# Patient Record
Sex: FEMALE | Race: OTHER | HISPANIC OR LATINO
[De-identification: names, ages, dates, MRNs, and addresses within clinical notes are randomized per-mention and may not be internally consistent; named-entity substitution may affect disease eponyms.]

---

## 2018-03-29 ENCOUNTER — APPOINTMENT (OUTPATIENT)
Dept: NEUROLOGY | Facility: CLINIC | Age: 43
End: 2018-03-29
Payer: MEDICAID

## 2018-03-29 VITALS
WEIGHT: 151 LBS | BODY MASS INDEX: 30.44 KG/M2 | DIASTOLIC BLOOD PRESSURE: 86 MMHG | HEIGHT: 59 IN | SYSTOLIC BLOOD PRESSURE: 118 MMHG

## 2018-03-29 DIAGNOSIS — G44.89 OTHER HEADACHE SYNDROME: ICD-10-CM

## 2018-03-29 DIAGNOSIS — R42 DIZZINESS AND GIDDINESS: ICD-10-CM

## 2018-03-29 PROCEDURE — 99214 OFFICE O/P EST MOD 30 MIN: CPT

## 2018-03-29 RX ORDER — NAPROXEN 500 MG/1
500 TABLET ORAL
Qty: 60 | Refills: 0 | Status: COMPLETED | COMMUNITY
Start: 2018-01-24

## 2018-03-29 RX ORDER — ACETAMINOPHEN 325 MG/1
TABLET, FILM COATED ORAL
Refills: 0 | Status: ACTIVE | COMMUNITY

## 2018-05-17 ENCOUNTER — APPOINTMENT (OUTPATIENT)
Dept: NEUROLOGY | Facility: CLINIC | Age: 43
End: 2018-05-17

## 2019-01-22 ENCOUNTER — EMERGENCY (EMERGENCY)
Facility: HOSPITAL | Age: 44
LOS: 1 days | Discharge: HOME | End: 2019-01-22
Attending: STUDENT IN AN ORGANIZED HEALTH CARE EDUCATION/TRAINING PROGRAM
Payer: COMMERCIAL

## 2019-01-22 VITALS
DIASTOLIC BLOOD PRESSURE: 100 MMHG | SYSTOLIC BLOOD PRESSURE: 157 MMHG | OXYGEN SATURATION: 97 % | HEART RATE: 118 BPM | WEIGHT: 149.91 LBS | HEIGHT: 60 IN | RESPIRATION RATE: 18 BRPM | TEMPERATURE: 100 F

## 2019-01-22 DIAGNOSIS — Z98.89 OTHER SPECIFIED POSTPROCEDURAL STATES: Chronic | ICD-10-CM

## 2019-01-22 DIAGNOSIS — Z90.710 ACQUIRED ABSENCE OF BOTH CERVIX AND UTERUS: Chronic | ICD-10-CM

## 2019-01-22 PROCEDURE — 99283 EMERGENCY DEPT VISIT LOW MDM: CPT | Mod: 25

## 2019-01-23 VITALS
OXYGEN SATURATION: 98 % | HEART RATE: 98 BPM | TEMPERATURE: 100 F | DIASTOLIC BLOOD PRESSURE: 86 MMHG | SYSTOLIC BLOOD PRESSURE: 145 MMHG | RESPIRATION RATE: 18 BRPM

## 2019-01-23 LAB — RAPID RVP RESULT: SIGNIFICANT CHANGE UP

## 2019-01-23 PROCEDURE — 87798 DETECT AGENT NOS DNA AMP: CPT

## 2019-01-23 PROCEDURE — 71046 X-RAY EXAM CHEST 2 VIEWS: CPT

## 2019-01-23 PROCEDURE — 87486 CHLMYD PNEUM DNA AMP PROBE: CPT

## 2019-01-23 PROCEDURE — 71046 X-RAY EXAM CHEST 2 VIEWS: CPT | Mod: 26

## 2019-01-23 PROCEDURE — 99283 EMERGENCY DEPT VISIT LOW MDM: CPT

## 2019-01-23 PROCEDURE — 87581 M.PNEUMON DNA AMP PROBE: CPT

## 2019-01-23 PROCEDURE — 87633 RESP VIRUS 12-25 TARGETS: CPT

## 2019-01-23 RX ORDER — IBUPROFEN 200 MG
600 TABLET ORAL ONCE
Qty: 0 | Refills: 0 | Status: COMPLETED | OUTPATIENT
Start: 2019-01-23 | End: 2019-01-23

## 2019-01-23 RX ADMIN — Medication 600 MILLIGRAM(S): at 00:33

## 2019-01-23 NOTE — ED PROVIDER NOTE - OBJECTIVE STATEMENT
42 y/o F with PMHx of HLD and PSHx of appendectomy, cholecystectomy and hysterectomy presents to ED c/o generalized body aches, sore throat, weakness, cough, runny nose and congestion onset several days ago. Last night, around 1900, took her temperature and was febrile. Was seen by PMD at onset of symptoms for cough and was given Azithromycin, which she has been taking. She did not get the flu shot this year. Denies chest pain, difficulty breathing, abdominal pain, nausea, vomiting, diarrhea, urinary symptoms or decreased PO intake. No further acute complaints at this time.    PMD: Reta

## 2019-01-23 NOTE — ED PROVIDER NOTE - ENMT, MLM
Airway patent, Nasal mucosa clear. Mouth with normal mucosa. Throat has no vesicles, no oropharyngeal exudates and uvula is midline. TM's clear bilaterally

## 2019-01-23 NOTE — ED PROVIDER NOTE - MEDICAL DECISION MAKING DETAILS
Patient is concerned she has the flu, will check RVP and CXR. Motrin for myalgias and fever control. D/c with PCP followup.

## 2019-01-23 NOTE — ED PROVIDER NOTE - ATTENDING CONTRIBUTION TO CARE
44 yo with flu like symptoms. I personally saw the patient with the PA, and completed the key components of the history and physical exam. I then discussed the management plan with the PA.

## 2019-01-23 NOTE — ED PROVIDER NOTE - CHPI ED SYMPTOMS NEG
no vomiting/no nausea/diarrhea, abdominal pain, chest pain, difficulty breathing, urinary sx or decreased PO intake

## 2020-05-29 ENCOUNTER — EMERGENCY (EMERGENCY)
Facility: HOSPITAL | Age: 45
LOS: 1 days | Discharge: HOME | End: 2020-05-29
Attending: EMERGENCY MEDICINE
Payer: COMMERCIAL

## 2020-05-29 VITALS
HEIGHT: 59 IN | TEMPERATURE: 98 F | RESPIRATION RATE: 20 BRPM | DIASTOLIC BLOOD PRESSURE: 116 MMHG | OXYGEN SATURATION: 99 % | WEIGHT: 153 LBS | SYSTOLIC BLOOD PRESSURE: 155 MMHG | HEART RATE: 90 BPM

## 2020-05-29 DIAGNOSIS — Z90.710 ACQUIRED ABSENCE OF BOTH CERVIX AND UTERUS: Chronic | ICD-10-CM

## 2020-05-29 DIAGNOSIS — Z98.89 OTHER SPECIFIED POSTPROCEDURAL STATES: Chronic | ICD-10-CM

## 2020-05-29 LAB
ALBUMIN SERPL ELPH-MCNC: 4.4 G/DL — SIGNIFICANT CHANGE UP (ref 3.3–5.2)
ALP SERPL-CCNC: 66 U/L — SIGNIFICANT CHANGE UP (ref 40–120)
ALT FLD-CCNC: 28 U/L — SIGNIFICANT CHANGE UP
ANION GAP SERPL CALC-SCNC: 15 MMOL/L — SIGNIFICANT CHANGE UP (ref 5–17)
AST SERPL-CCNC: 27 U/L — SIGNIFICANT CHANGE UP
BASOPHILS # BLD AUTO: 0.07 K/UL — SIGNIFICANT CHANGE UP (ref 0–0.2)
BASOPHILS NFR BLD AUTO: 0.6 % — SIGNIFICANT CHANGE UP (ref 0–2)
BILIRUB SERPL-MCNC: 0.2 MG/DL — LOW (ref 0.4–2)
BUN SERPL-MCNC: 11 MG/DL — SIGNIFICANT CHANGE UP (ref 8–20)
CALCIUM SERPL-MCNC: 9 MG/DL — SIGNIFICANT CHANGE UP (ref 8.6–10.2)
CHLORIDE SERPL-SCNC: 101 MMOL/L — SIGNIFICANT CHANGE UP (ref 98–107)
CO2 SERPL-SCNC: 21 MMOL/L — LOW (ref 22–29)
CREAT SERPL-MCNC: 0.61 MG/DL — SIGNIFICANT CHANGE UP (ref 0.5–1.3)
D DIMER BLD IA.RAPID-MCNC: <150 NG/ML DDU — SIGNIFICANT CHANGE UP
EOSINOPHIL # BLD AUTO: 0.1 K/UL — SIGNIFICANT CHANGE UP (ref 0–0.5)
EOSINOPHIL NFR BLD AUTO: 0.9 % — SIGNIFICANT CHANGE UP (ref 0–6)
GLUCOSE SERPL-MCNC: 99 MG/DL — SIGNIFICANT CHANGE UP (ref 70–99)
HCT VFR BLD CALC: 38 % — SIGNIFICANT CHANGE UP (ref 34.5–45)
HGB BLD-MCNC: 12.9 G/DL — SIGNIFICANT CHANGE UP (ref 11.5–15.5)
IMM GRANULOCYTES NFR BLD AUTO: 0.4 % — SIGNIFICANT CHANGE UP (ref 0–1.5)
LYMPHOCYTES # BLD AUTO: 2.79 K/UL — SIGNIFICANT CHANGE UP (ref 1–3.3)
LYMPHOCYTES # BLD AUTO: 24.1 % — SIGNIFICANT CHANGE UP (ref 13–44)
MAGNESIUM SERPL-MCNC: 2.3 MG/DL — SIGNIFICANT CHANGE UP (ref 1.6–2.6)
MCHC RBC-ENTMCNC: 30.9 PG — SIGNIFICANT CHANGE UP (ref 27–34)
MCHC RBC-ENTMCNC: 33.9 GM/DL — SIGNIFICANT CHANGE UP (ref 32–36)
MCV RBC AUTO: 90.9 FL — SIGNIFICANT CHANGE UP (ref 80–100)
MONOCYTES # BLD AUTO: 0.76 K/UL — SIGNIFICANT CHANGE UP (ref 0–0.9)
MONOCYTES NFR BLD AUTO: 6.6 % — SIGNIFICANT CHANGE UP (ref 2–14)
NEUTROPHILS # BLD AUTO: 7.83 K/UL — HIGH (ref 1.8–7.4)
NEUTROPHILS NFR BLD AUTO: 67.4 % — SIGNIFICANT CHANGE UP (ref 43–77)
PLATELET # BLD AUTO: 374 K/UL — SIGNIFICANT CHANGE UP (ref 150–400)
POTASSIUM SERPL-MCNC: 4.6 MMOL/L — SIGNIFICANT CHANGE UP (ref 3.5–5.3)
POTASSIUM SERPL-SCNC: 4.6 MMOL/L — SIGNIFICANT CHANGE UP (ref 3.5–5.3)
PROT SERPL-MCNC: 7.6 G/DL — SIGNIFICANT CHANGE UP (ref 6.6–8.7)
RBC # BLD: 4.18 M/UL — SIGNIFICANT CHANGE UP (ref 3.8–5.2)
RBC # FLD: 12.4 % — SIGNIFICANT CHANGE UP (ref 10.3–14.5)
SODIUM SERPL-SCNC: 137 MMOL/L — SIGNIFICANT CHANGE UP (ref 135–145)
TROPONIN T SERPL-MCNC: <0.01 NG/ML — SIGNIFICANT CHANGE UP (ref 0–0.06)
TROPONIN T SERPL-MCNC: <0.01 NG/ML — SIGNIFICANT CHANGE UP (ref 0–0.06)
WBC # BLD: 11.6 K/UL — HIGH (ref 3.8–10.5)
WBC # FLD AUTO: 11.6 K/UL — HIGH (ref 3.8–10.5)

## 2020-05-29 PROCEDURE — 93010 ELECTROCARDIOGRAM REPORT: CPT

## 2020-05-29 PROCEDURE — 85379 FIBRIN DEGRADATION QUANT: CPT

## 2020-05-29 PROCEDURE — 93971 EXTREMITY STUDY: CPT

## 2020-05-29 PROCEDURE — 71045 X-RAY EXAM CHEST 1 VIEW: CPT | Mod: 26

## 2020-05-29 PROCEDURE — 93971 EXTREMITY STUDY: CPT | Mod: 26,LT

## 2020-05-29 PROCEDURE — 93005 ELECTROCARDIOGRAM TRACING: CPT

## 2020-05-29 PROCEDURE — 99285 EMERGENCY DEPT VISIT HI MDM: CPT

## 2020-05-29 PROCEDURE — 84484 ASSAY OF TROPONIN QUANT: CPT

## 2020-05-29 PROCEDURE — 36415 COLL VENOUS BLD VENIPUNCTURE: CPT

## 2020-05-29 PROCEDURE — 83735 ASSAY OF MAGNESIUM: CPT

## 2020-05-29 PROCEDURE — 80053 COMPREHEN METABOLIC PANEL: CPT

## 2020-05-29 PROCEDURE — 99284 EMERGENCY DEPT VISIT MOD MDM: CPT | Mod: 25

## 2020-05-29 PROCEDURE — 71045 X-RAY EXAM CHEST 1 VIEW: CPT

## 2020-05-29 PROCEDURE — 85027 COMPLETE CBC AUTOMATED: CPT

## 2020-05-29 RX ORDER — IBUPROFEN 200 MG
600 TABLET ORAL ONCE
Refills: 0 | Status: COMPLETED | OUTPATIENT
Start: 2020-05-29 | End: 2020-05-29

## 2020-05-29 RX ADMIN — Medication 600 MILLIGRAM(S): at 23:40

## 2020-05-29 NOTE — ED PROVIDER NOTE - NSFOLLOWUPCLINICS_GEN_ALL_ED_FT
[facility]  Cardiology  [address], [address]  [place] [address]  Phone: [phone]  Fax:   Follow Up Time: Urgent

## 2020-05-29 NOTE — ED PROVIDER NOTE - NSFOLLOWUPINSTRUCTIONS_ED_ALL_ED_FT
FOLLOW UP WITH PRIMARY CARE PROVIDER IN 1-2 DAYS     FOLLOW UP WITH CARDIOLOGY WITHIN 1 WEEK     Chest Pain    Chest pain can be caused by many different conditions which may or may not be dangerous. Causes include heartburn, lung infections, heart attack, blood clot in lungs, skin infections, strain or damage to muscle, cartilage, or bones, etc. In addition to a history and physical examination, an electrocardiogram (ECG) or other lab tests may have been performed to determine the cause of your chest pain. Follow up with your primary care provider or with a cardiologist as instructed.     SEEK IMMEDIATE MEDICAL CARE IF YOU HAVE ANY OF THE FOLLOWING SYMPTOMS: worsening chest pain, coughing up blood, unexplained back/neck/jaw pain, severe abdominal pain, dizziness or lightheadedness, fainting, shortness of breath, sweaty or clammy skin, vomiting, or racing heart beat. These symptoms may represent a serious problem that is an emergency. Do not wait to see if the symptoms will go away. Get medical help right away. Call 911 and do not drive yourself to the hospital.

## 2020-05-29 NOTE — ED PROVIDER NOTE - PATIENT PORTAL LINK FT
You can access the FollowMyHealth Patient Portal offered by [facility] by registering at the following website: [email or web address]. By joining [employer]’s FollowMyHealth portal, you will also be able to view your health information using other applications (apps) compatible with our system. You can access the FollowMyHealth Patient Portal offered by [facility] by registering at the following website: [email or web address]. By joining [employer]’s FollowMyHealth portal, you will also be able to view your health information using other applications (apps) compatible with our system.

## 2020-05-29 NOTE — ED STATDOCS - PROGRESS NOTE DETAILS
43 y/o F pt with significant PMHx of HTN and HLD presents to the ED c/o dizziness that started five hours ago. She further reports mild CP and nausea. Pt stated that her sx became worse after eating. She denies smoking but reports that her father  of a stroke at the age of 52. No further complaints at this time.

## 2020-05-29 NOTE — ED PROVIDER NOTE - ATTENDING CONTRIBUTION TO CARE
HPI: 45yo female pmh hyperlipidemia presenting with episode of dull headache a/w feeling lightheaded, followed by episode of chest pain. Patient states that she thought she would feel better after she ate something, however noticed that headache improved but then developed chest pain. No fevers/chills. No cough. No shortness of breath. No diaphoresis. No h/o Coronary Artery Disease or myocardial infarction. NO h/o DVT/PE. Patient does notice that L antecubital fossa has been swollen and more painful over last 4 days.     PE:  Gen: NAD  Head: NCAT  HEENT: Oral mucosa moist, normal conjunctiva  CV: RRR no murmurs, normal perfusion  Resp: CTA b/l, no wheezing, rales, rhonchi, no respiratory distress  GI: Abd Soft NTND  Neuro: No focal neuro deficits  MSK: FROM all 4 extremities, no deformity  Skin: L antecubital fossa slightly edematous, mildly tender  Psych: Normal affect    MDM: 45yo female with headache and chest pain, headache now resolved. Neuro intact. EKG normal. US LUE neg for deep venous thrombosis. D-dimer negative- unlikely PE. Pending rpt trop r/o acute coronary syndrome, if unremarkable dc home with cards f/u. Adriana Lanier DO     I performed a history and physical exam of the patient and discussed their management with the advanced care provider. I reviewed the advanced care provider's note and agree with the documented findings and plan of care. My medical decision making and objective findings are found above.

## 2020-05-29 NOTE — ED PROVIDER NOTE - OBJECTIVE STATEMENT
45 y/o female with beto of migraines and borderline DM/HTN/HLD presents with complaints of mid sternal chest pain upon inspiration associated with weakness and elevated BP. She stated that she developed ha, nausea, and dizziness around noon today which she attributed to not having eaten breakfast. After eating lunch the sx subsided and the ha improved. She then developed weakness and chest pain upon inspiration, she check her BP which was 152 systolic. She also complains of LUE arm pain x 5 days. She denies any radiating pain, fevers, changes in vision, vomiting, diarrhea, sick contacts, abdominal pain, , or GI pain.

## 2020-05-29 NOTE — ED PROVIDER NOTE - MUSCULOSKELETAL MINIMAL EXAM
normal range of motion/atraumatic/TENDERNESS/L arm TTP from Mid bicep to mid forearm on medial aspect, no edema, erythema, or ecchymosis atraumatic/normal range of motion/TENDERNESS/L arm TTP from Mid bicep to mid forearm on medial aspect with mild edema, no erythema or ecchymosis

## 2020-05-29 NOTE — ED PROVIDER NOTE - PROGRESS NOTE DETAILS
NP Arena: Patient verbalizes improvement of sx but now has anterior chest wall tenderness. Will give motrin 600 mg PO. Pending 2nd Troponin, if negative will d/c home with PCP and Cardiology follow up. Patient verbalizes understanding and agrees with plan of care.

## 2020-05-29 NOTE — ED PROVIDER NOTE - CLINICAL SUMMARY MEDICAL DECISION MAKING FREE TEXT BOX
45 y/o female with beto of migraines and borderline HTN/DM/HLD presenting with mid sternal cp upon inspiration since 1200 today and LUE pain x 5 days that is ttp. Will rule out acs vs PE and order d-dimer, troponin, cxray, ekg, cbc, cmp. 43 y/o female with beto of migraines and borderline HTN/DM/HLD presenting with mid sternal cp upon inspiration since 1200 today and LUE pain x 5 days that is ttp. Will rule out acs vs PE and order d-dimer, troponin, cxray, ekg, cbc, cmp, LUE US venous duplex.

## 2021-01-10 ENCOUNTER — EMERGENCY (EMERGENCY)
Facility: HOSPITAL | Age: 46
LOS: 1 days | Discharge: HOME | End: 2021-01-10
Attending: EMERGENCY MEDICINE
Payer: COMMERCIAL

## 2021-01-10 VITALS
TEMPERATURE: 99 F | HEART RATE: 102 BPM | SYSTOLIC BLOOD PRESSURE: 170 MMHG | HEIGHT: 59 IN | WEIGHT: 149.91 LBS | RESPIRATION RATE: 18 BRPM | OXYGEN SATURATION: 96 % | DIASTOLIC BLOOD PRESSURE: 103 MMHG

## 2021-01-10 DIAGNOSIS — Z90.710 ACQUIRED ABSENCE OF BOTH CERVIX AND UTERUS: Chronic | ICD-10-CM

## 2021-01-10 DIAGNOSIS — Z98.89 OTHER SPECIFIED POSTPROCEDURAL STATES: Chronic | ICD-10-CM

## 2021-01-10 PROCEDURE — 99283 EMERGENCY DEPT VISIT LOW MDM: CPT

## 2021-01-10 PROCEDURE — 73562 X-RAY EXAM OF KNEE 3: CPT

## 2021-01-10 PROCEDURE — 73562 X-RAY EXAM OF KNEE 3: CPT | Mod: 26,RT

## 2021-01-10 RX ORDER — ACETAMINOPHEN 500 MG
650 TABLET ORAL ONCE
Refills: 0 | Status: COMPLETED | OUTPATIENT
Start: 2021-01-10 | End: 2021-01-10

## 2021-01-10 RX ORDER — KETOROLAC TROMETHAMINE 30 MG/ML
30 SYRINGE (ML) INJECTION ONCE
Refills: 0 | Status: DISCONTINUED | OUTPATIENT
Start: 2021-01-10 | End: 2021-01-10

## 2021-01-10 RX ADMIN — Medication 650 MILLIGRAM(S): at 12:32

## 2021-01-10 NOTE — ED PROVIDER NOTE - PROGRESS NOTE DETAILS
xray results discussed with the patient, discussed the need to follow up with orthopedic for further assessment

## 2021-01-10 NOTE — ED PROVIDER NOTE - ATTENDING CONTRIBUTION TO CARE
Gillian: I performed a face to face bedside interview with patient regarding history of present illness, review of symptoms and past medical history. I completed an independent physical exam.  I have discussed patient's plan of care with advanced care provider.   I agree with note as stated above including HISTORY OF PRESENT ILLNESS, HIV, PAST MEDICAL/SURGICAL/FAMILY/SOCIAL HISTORY, ALLERGIES AND HOME MEDICATIONS, REVIEW OF SYSTEMS, PHYSICAL EXAM, MEDICAL DECISION MAKING and any PROGRESS NOTES during the time I functioned as the attending physician for this patient  unless otherwise noted. My brief assessment is as follows: Patient with acute on chronic knee pain s/p twisting it. Exam notable for minimal swelling, +ttp over medial knee. no patellar ttp. no laxity. DROM 2/2 pain. Likely ligamentous injury, plan for xray, pain control. Likely knee immobilizer/crutches and ortho f/u.

## 2021-01-10 NOTE — ED PROVIDER NOTE - PATIENT PORTAL LINK FT
You can access the FollowMyHealth Patient Portal offered by [facility] by registering at the following website: [email or web address]. By joining [employer]’s FollowMyHealth portal, you will also be able to view your health information using other applications (apps) compatible with our system.

## 2021-01-10 NOTE — ED PROVIDER NOTE - OBJECTIVE STATEMENT
45 year old female with no known pmhx presents with c/o knee pain. Pt states she injured her [place] she was 18 years old [place] she [place] hit by a baseball to the posterior knee and fell onto the anterior knee. Pt states for the last 2 years the pain has increased and she has had many episodes of the knee "giving out". Last night she was dancing when she felt increased pain and it is now difficult to walk. She has never had this injury evaluated before nor had any prior imaging. Has been taking tylenol for pain relief. Denies falls, numbness and tingling.

## 2021-01-10 NOTE — ED PROVIDER NOTE - PHYSICAL EXAMINATION
msk: right knee swelling, tenderness along lateral patella, increased pain with varus stress and extension. decreased range of motion with extension, normal flexion. 4+ pulses. skin normal, no lesions, erythema or warmth to skin

## 2021-01-10 NOTE — ED PROVIDER NOTE - NSFOLLOWUPINSTRUCTIONS_ED_ALL_ED_FT
Follow up with orthopedist   Take tylenol as needed for pain   Wear knee immobilizer and use crutches   Ice   rest   Elevate

## 2021-01-10 NOTE — ED PROVIDER NOTE - CARE PROVIDER_API CALL
Breonna Elizabeth)  Orthopedics  [address] [place] [address], [address]  [place] [address]  Phone: [phone]  Fax: [phone]  Follow Up Time:

## 2021-01-19 ENCOUNTER — APPOINTMENT (OUTPATIENT)
Dept: ORTHOPEDIC SURGERY | Facility: CLINIC | Age: 46
End: 2021-01-19
Payer: MEDICAID

## 2021-01-19 VITALS
BODY MASS INDEX: 30.24 KG/M2 | SYSTOLIC BLOOD PRESSURE: 163 MMHG | HEART RATE: 76 BPM | WEIGHT: 150 LBS | HEIGHT: 59 IN | DIASTOLIC BLOOD PRESSURE: 83 MMHG

## 2021-01-19 PROCEDURE — 99204 OFFICE O/P NEW MOD 45 MIN: CPT

## 2021-01-19 PROCEDURE — 99072 ADDL SUPL MATRL&STAF TM PHE: CPT

## 2021-01-27 ENCOUNTER — OUTPATIENT (OUTPATIENT)
Dept: OUTPATIENT SERVICES | Facility: HOSPITAL | Age: 46
LOS: 1 days | End: 2021-01-27
Payer: COMMERCIAL

## 2021-01-27 ENCOUNTER — APPOINTMENT (OUTPATIENT)
Dept: MRI IMAGING | Facility: CLINIC | Age: 46
End: 2021-01-27
Payer: MEDICAID

## 2021-01-27 DIAGNOSIS — M25.561 PAIN IN RIGHT KNEE: ICD-10-CM

## 2021-01-27 DIAGNOSIS — Z90.710 ACQUIRED ABSENCE OF BOTH CERVIX AND UTERUS: Chronic | ICD-10-CM

## 2021-01-27 DIAGNOSIS — Z98.89 OTHER SPECIFIED POSTPROCEDURAL STATES: Chronic | ICD-10-CM

## 2021-01-27 DIAGNOSIS — Z00.8 ENCOUNTER FOR OTHER GENERAL EXAMINATION: ICD-10-CM

## 2021-01-27 PROCEDURE — 73721 MRI JNT OF LWR EXTRE W/O DYE: CPT | Mod: 26,RT

## 2021-01-27 PROCEDURE — 73721 MRI JNT OF LWR EXTRE W/O DYE: CPT

## 2021-02-10 ENCOUNTER — APPOINTMENT (OUTPATIENT)
Dept: ORTHOPEDIC SURGERY | Facility: CLINIC | Age: 46
End: 2021-02-10

## 2021-03-03 ENCOUNTER — APPOINTMENT (OUTPATIENT)
Dept: ORTHOPEDIC SURGERY | Facility: CLINIC | Age: 46
End: 2021-03-03
Payer: MEDICAID

## 2021-03-03 VITALS
DIASTOLIC BLOOD PRESSURE: 82 MMHG | WEIGHT: 150 LBS | BODY MASS INDEX: 30.24 KG/M2 | HEIGHT: 59 IN | SYSTOLIC BLOOD PRESSURE: 157 MMHG

## 2021-03-03 DIAGNOSIS — Z72.3 LACK OF PHYSICAL EXERCISE: ICD-10-CM

## 2021-03-03 DIAGNOSIS — Z78.9 OTHER SPECIFIED HEALTH STATUS: ICD-10-CM

## 2021-03-03 PROCEDURE — 99072 ADDL SUPL MATRL&STAF TM PHE: CPT

## 2021-03-03 PROCEDURE — 99214 OFFICE O/P EST MOD 30 MIN: CPT

## 2021-03-03 NOTE — REVIEW OF SYSTEMS
[Joint Pain] : joint pain [Joint Stiffness] : joint stiffness [Negative] : Heme/Lymph [FreeTextEntry9] : right knee pain and swelling

## 2021-03-03 NOTE — PHYSICAL EXAM
[de-identified] : General:\par Awake, alert, no acute distress, Patient was cooperative and appropriate during the examination.\par \par The patient is of normal weight for height and age.\par \par Walks with an antalgic gait on the right side.\par \par Full, painless range of motion of the neck and back.\par \par Exam of the bilateral lower extremities is intact and symmetric with regards to dermatologic, vascular, and neurologic exam. Bilateral lower extremity sensation is grossly intact to light touch in the DP/SP/T/S/S nerve distributions. Intact DF/PF/EHL. BIlateral lower extremity warm and well-perfused with brisk capillary refill.\par \par Pulmonary:\par Regular, nonlabored breathing\par \par Abdomen:\par Soft, nontender, nondistended.\par \par Lymphatic:\par No evidence of inguinal lymphadenopathy\par \par Right knee Examination:\par Physical examination of the knee demonstrates normal skin without signs of skin changes or abnormalities. No erythema or warmth is appreciated. There is a moderate joint effusion.\par \par Sensation is intact to light touch L2-S1\par Palpable DP/PT pulse\par EHL/FHL/TA/GSC motor function intact\par \par Range of Motion\par 0 to 130 degrees with pain at terminal flexion\par \par Strength Testing\par Quadriceps/Hamstring 5/5\par Patient is able to perform a straight leg raise without difficulty.\par \par Palpation\par Not tender to palpation about the distal femur, proximal tibia, or patella\par No palpable defect appreciated in the quadriceps or patellar tendons\par Exquisitely tender to palpation of medial joint line\par Mildly tender to palpation of lateral joint line\par \par Special Tests\par Anterior Drawer positive\par Posterior Drawer negative\par Lachman Exam grade 2B\par No Varus or Valgus Laxity at 0 or 30 degrees of knee flexion\par Chapincito's Test positive medially\par Active compression of the patella is negative for pain\par Translation of the patella less than 2 quadrants with a firm endpoint [de-identified] : The following radiographs were obtained at the ED on 1/10/21.  I reviewed each radiograph in detail with the patient and discussed the findings as highlighted below.\par \par 3 views of the right knee were unremarkable.\par \par MRI obtained from a [facility] imaging facility was reviewed in great detail with the patient and reveals:\par 1. Anterior cruciate ligament rupture is of indeterminate age is no osseous contusions are appreciated.\par 2. Bucket-handle tear of the medial meniscus with large flap displaced into the intercondylar notch.\par 3. Mild medial gastrocnemius muscle strain.\par 4. Medial gastrocnemius tear is present that may be incidental. Ultrasound may be helpful for further evaluation of thrombophlebitis as warranted.\par \par \par \par \par \par \par

## 2021-03-03 NOTE — DISCUSSION/SUMMARY
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  I reviewed the patient's MRI today with her in the office which demonstrates a chronic ACL rupture and acute bucket-handle tear of the medial meniscus with the meniscus displaced into the intercondylar notch.  We discussed the risks and benefits of no treatment as well as nonoperative and operative treatments.  Nonsurgical treatment would include anti-inflammatories as needed, bracing as needed, and progressive physical therapy to help improve her pain, swelling and restore her motion.  The advantages of nonsurgical treatment are that it avoids the risks associated with surgery.  The disadvantages of nonsurgical treatment are that it is unlikely to heal her meniscus and her meniscus can continue to cause her significant pain and swelling.  Surgical intervention could include 2 options.  The first option is to perform a right knee arthroscopy with partial medial meniscectomy versus possible repair, chondroplasty, and synovectomy.  The second option is to perform a right knee arthroscopically assisted ACL reconstruction with BTB allograft, partial medial meniscectomy versus possible repair, chondroplasty, and synovectomy.  The advantages of the knee arthroscopy in isolation are that it would address her pain and swelling associated with her meniscus tear and the recovery is much faster.  However, it would fail to address her pre-existing instability which she has been living with since she was 18 years of age.  The advantages of the ACL reconstruction are that it would address both the meniscus as well as the ACL which would correct her instability and protect her knee from progressive arthritis in the long-term.  The disadvantages are that there are higher complications a much longer recovery.  I expect patients to return to full activities without restrictions at about 6 to 9 months after an ACL reconstruction procedure.  The patient understands all these options and does not wish to undergo an ACL reconstruction procedure because of the prolonged rehabilitation.  She would prefer to proceed with the arthroscopic procedure to address her meniscus tear only.  The risks and benefits of the surgery were discussed in detail.  The benefits include improved knee pain and function.  This will not address her pre-existing instability and this could lead to progressive arthritis and further damage to her meniscus in the future because of her instability.  The patient understands.  The risks of surgery include but not limited to infection, blood loss, blood clots, neurovascular injury, stiffness/loss of range of motion, persistent instability, progressive arthritis, anesthesia related complications including paralysis and death, and the need for further surgery in the future.  In the setting of a meniscus repair she will be restricted to partial weightbearing for approximately 6 weeks in a brace.  If a partial meniscectomy is performed she will be allowed to bear weight as tolerated with use of crutches for assistance with ambulation.  I expect most patients to recover 2 to 3 months after a partial meniscectomy and 4 to 6 months after a meniscal repair.  Patient verbalizes understanding of all surgical risks as well as the anticipated postoperative course and would like to proceed with a knee arthroscopy.  She will speak with my surgical coordinator regarding a date and time for the procedure.  All questions were answered to her satisfaction.\par

## 2021-03-03 NOTE — HISTORY OF PRESENT ILLNESS
[Stable] : stable [___ wks] : [unfilled] week(s) ago [5] : an average pain level of 5/10 [4] : a minimum pain level of 4/10 [9] : a maximum pain level of 9/10 [de-identified] : LIAM is a 45 year old female who presents today for initial evaluation of right knee pain following twisting injury on 1/10/21.  She has hx. of right knee injury at age 18 in her [place]. She did not seek treatment at that time.  She admits to experiencing frequent knee instability for many years leading up to her most recent injury. In January of this year she twisted her knee dancing with friends. She twisted, felt a tear, and had progressively worsening swelling. She went to the ED on 1/10/21 where x-rays of the right knee were obtained which were unremarkable for osseous pathology.  She followed up with Dr. Elizabeth 9 days later who at that time ordered an MRI and Meloxicam.  \par \par Currently, patient presents wearing a neoprene knee sleeve that she states is too small for her.  She admits to inability to fully WB in the mornings.  She denies mechanical symptoms such as locking, catching, or popping.  She does, however, admits to feeling extremely unstable and weak.  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  The patient denies numbness/tingling to the extremity.  She also denies recent fever, chills, redness, nausea, vomiting, or illness.\par

## 2021-03-03 NOTE — REASON FOR VISIT
[Initial Visit] : an initial visit for [Knee Injury] : knee injury [FreeTextEntry2] : right knee pain and swelling.

## 2021-03-29 ENCOUNTER — APPOINTMENT (OUTPATIENT)
Dept: ULTRASOUND IMAGING | Facility: CLINIC | Age: 46
End: 2021-03-29
Payer: MEDICAID

## 2021-03-29 ENCOUNTER — EMERGENCY (EMERGENCY)
Facility: HOSPITAL | Age: 46
LOS: 1 days | Discharge: HOME | End: 2021-03-29
Attending: EMERGENCY MEDICINE
Payer: COMMERCIAL

## 2021-03-29 ENCOUNTER — OUTPATIENT (OUTPATIENT)
Dept: OUTPATIENT SERVICES | Facility: HOSPITAL | Age: 46
LOS: 1 days | End: 2021-03-29

## 2021-03-29 ENCOUNTER — APPOINTMENT (OUTPATIENT)
Dept: ORTHOPEDIC SURGERY | Facility: CLINIC | Age: 46
End: 2021-03-29
Payer: MEDICAID

## 2021-03-29 VITALS
OXYGEN SATURATION: 97 % | TEMPERATURE: 98 F | RESPIRATION RATE: 18 BRPM | HEIGHT: 59 IN | SYSTOLIC BLOOD PRESSURE: 193 MMHG | HEART RATE: 92 BPM | WEIGHT: 149.91 LBS | DIASTOLIC BLOOD PRESSURE: 105 MMHG

## 2021-03-29 VITALS
BODY MASS INDEX: 30.24 KG/M2 | HEART RATE: 77 BPM | WEIGHT: 150 LBS | HEIGHT: 59 IN | DIASTOLIC BLOOD PRESSURE: 99 MMHG | SYSTOLIC BLOOD PRESSURE: 164 MMHG

## 2021-03-29 VITALS — DIASTOLIC BLOOD PRESSURE: 86 MMHG | SYSTOLIC BLOOD PRESSURE: 166 MMHG

## 2021-03-29 VITALS — TEMPERATURE: 98.6 F

## 2021-03-29 DIAGNOSIS — Z98.89 OTHER SPECIFIED POSTPROCEDURAL STATES: Chronic | ICD-10-CM

## 2021-03-29 DIAGNOSIS — Z90.710 ACQUIRED ABSENCE OF BOTH CERVIX AND UTERUS: Chronic | ICD-10-CM

## 2021-03-29 DIAGNOSIS — M79.604 PAIN IN RIGHT LEG: ICD-10-CM

## 2021-03-29 LAB
ANION GAP SERPL CALC-SCNC: 14 MMOL/L — SIGNIFICANT CHANGE UP (ref 5–17)
BUN SERPL-MCNC: 11 MG/DL — SIGNIFICANT CHANGE UP (ref 8–20)
CALCIUM SERPL-MCNC: 8.8 MG/DL — SIGNIFICANT CHANGE UP (ref 8.6–10.2)
CHLORIDE SERPL-SCNC: 100 MMOL/L — SIGNIFICANT CHANGE UP (ref 98–107)
CO2 SERPL-SCNC: 25 MMOL/L — SIGNIFICANT CHANGE UP (ref 22–29)
CREAT SERPL-MCNC: 0.85 MG/DL — SIGNIFICANT CHANGE UP (ref 0.5–1.3)
GLUCOSE SERPL-MCNC: 124 MG/DL — HIGH (ref 70–99)
HCT VFR BLD CALC: 37.7 % — SIGNIFICANT CHANGE UP (ref 34.5–45)
HGB BLD-MCNC: 12.8 G/DL — SIGNIFICANT CHANGE UP (ref 11.5–15.5)
MCHC RBC-ENTMCNC: 31.1 PG — SIGNIFICANT CHANGE UP (ref 27–34)
MCHC RBC-ENTMCNC: 34 GM/DL — SIGNIFICANT CHANGE UP (ref 32–36)
MCV RBC AUTO: 91.7 FL — SIGNIFICANT CHANGE UP (ref 80–100)
PLATELET # BLD AUTO: 382 K/UL — SIGNIFICANT CHANGE UP (ref 150–400)
POTASSIUM SERPL-MCNC: 3.7 MMOL/L — SIGNIFICANT CHANGE UP (ref 3.5–5.3)
POTASSIUM SERPL-SCNC: 3.7 MMOL/L — SIGNIFICANT CHANGE UP (ref 3.5–5.3)
RBC # BLD: 4.11 M/UL — SIGNIFICANT CHANGE UP (ref 3.8–5.2)
RBC # FLD: 12.6 % — SIGNIFICANT CHANGE UP (ref 10.3–14.5)
SODIUM SERPL-SCNC: 139 MMOL/L — SIGNIFICANT CHANGE UP (ref 135–145)
WBC # BLD: 11.34 K/UL — HIGH (ref 3.8–10.5)
WBC # FLD AUTO: 11.34 K/UL — HIGH (ref 3.8–10.5)

## 2021-03-29 PROCEDURE — 86769 SARS-COV-2 COVID-19 ANTIBODY: CPT

## 2021-03-29 PROCEDURE — 93971 EXTREMITY STUDY: CPT | Mod: 26,RT

## 2021-03-29 PROCEDURE — 85027 COMPLETE CBC AUTOMATED: CPT

## 2021-03-29 PROCEDURE — 99072 ADDL SUPL MATRL&STAF TM PHE: CPT

## 2021-03-29 PROCEDURE — 99283 EMERGENCY DEPT VISIT LOW MDM: CPT

## 2021-03-29 PROCEDURE — 99213 OFFICE O/P EST LOW 20 MIN: CPT

## 2021-03-29 PROCEDURE — 36415 COLL VENOUS BLD VENIPUNCTURE: CPT

## 2021-03-29 PROCEDURE — 99284 EMERGENCY DEPT VISIT MOD MDM: CPT

## 2021-03-29 PROCEDURE — 80048 BASIC METABOLIC PNL TOTAL CA: CPT

## 2021-03-29 RX ORDER — RIVAROXABAN 15 MG-20MG
15 KIT ORAL ONCE
Refills: 0 | Status: COMPLETED | OUTPATIENT
Start: 2021-03-29 | End: 2021-03-29

## 2021-03-29 RX ORDER — RIVAROXABAN 15 MG-20MG
1 KIT ORAL
Qty: 60 | Refills: 0
Start: 2021-03-29 | End: 2021-04-27

## 2021-03-29 RX ADMIN — RIVAROXABAN 15 MILLIGRAM(S): KIT at 23:32

## 2021-03-29 NOTE — ED ADULT TRIAGE NOTE - CHIEF COMPLAINT QUOTE
patient states that she twisted leg in january, pain to right knee, seen by ortho today, complaining of pain to back of leg pain and swelling sent by MD

## 2021-03-29 NOTE — ED STATDOCS - PHYSICAL EXAMINATION
Gen: NAD, AOx3  Head: NCAT  HEENT: PERRL, EOMI, oral mucosa moist, normal conjunctiva, neck supple  Lung: CTAB, no respiratory distress  CV: rrr, no murmur, Normal perfusion  Abd: soft, NTND, no CVA tenderness  MSK: No edema, no visible deformities  Neuro: No focal neurologic deficits  Skin: No rash   Psych: normal affect Gen: NAD, AOx3  Head: NCAT  HEENT: PERRL, EOMI, oral mucosa moist, normal conjunctiva, neck supple  Lung: CTAB, no respiratory distress  CV: rrr, no murmur, Normal perfusion  MSK: No pitting edema, no visible deformities  Neuro: No focal neurologic deficits  Skin: No rash   Psych: normal affect

## 2021-03-29 NOTE — ED STATDOCS - PROGRESS NOTE DETAILS
MEL GONZÁLES: PT evaluated by intake physician. HPI/PE/ROS as noted above. Will follow up plan per intake physician  labs stable written for xarelto starter pack to be picked up at [facility] in the am and first dose this evening . given strict return precautions

## 2021-03-29 NOTE — ED STATDOCS - OBJECTIVE STATEMENT
46 y/o F with PMHx of appendectomy, cholecystectomy, hysterectomy, HLD presents to the ED c/o R calf pain. Pt explains that she saw her orthopedic today for a procedure she is going to have to her knee and told him that she has been having R calf pain. Pt had a sonogram that showed DVT.  Denies CP, difficulty breathing, blood in stool 44 y/o F with PMHx of appendectomy, cholecystectomy, hysterectomy, HLD presents to the ED c/o R calf pain. Pt explains that she saw her orthopedic today for a procedure she is going to have to her knee and told him that she has been having R calf pain. Pt had a sonogram that showed DVT. Pt states that she had rectal bleeding x2 months ago that was resolved.   Denies CP, difficulty breathing, blood in stool

## 2021-03-29 NOTE — ED STATDOCS - PATIENT PORTAL LINK FT
You can access the FollowMyHealth Patient Portal offered by [facility] by registering at the following website: [email or web address]. By joining [employer]’s FollowMyHealth portal, you will also be able to view your health information using other applications (apps) compatible with our system.

## 2021-03-29 NOTE — ED STATDOCS - NSFOLLOWUPINSTRUCTIONS_ED_ALL_ED_FT
por favor, recoja el medicamento en la farmacia [facility] en el [facility] por la mañana y tómelo según las indicaciones  siga con ortopedia y con steve médico de atención primaria    La trombosis venosa profunda  Elie trombosis venosa profunda (TVP) es un coágulo de sonja (trombo) que generalmente ocurre en elie vena profunda y más sharon de la parte inferior de la pierna o la pelvis, o en elie extremidad superior shawna el brazo. Estos son peligrosos y pueden provocar complicaciones graves e incluso potencialmente mortales si el coágulo viaja a los pulmones. Los síntomas incluyen hinchazón del brazo o la pierna, calor y enrojecimiento del brazo o la pierna y dolor. El tratamiento puede incluir lee ann un medicamento anticoagulante; asegúrese de lee ann todo lo prescrito según las instrucciones.    BUSQUE ATENCIÓN MÉDICA INMEDIATA SI TIENE ALGUNO DE LOS SIGUIENTES SÍNTOMAS: dificultad para respirar, dolor en el pecho, latidos cardíacos rápidos o irregulares, aturdimiento / mareo, tos con sonja o cualquier sangrado en el vómito, las heces o la orina. Estos síntomas pueden representar un problema grave que sea elie emergencia. No espere a poli si los síntomas desaparecen. Llame al 911 y no conduzca al hospital.           medication at [facility] in the morning and take as directed   please follow with orthopedics and with your primary care doctor     Deep Vein Thrombosis  A deep vein thrombosis (DVT) is a blood clot (thrombus) that usually occurs in a deep, larger vein of the lower leg or the pelvis, or in an upper extremity such as the arm. These are dangerous and can lead to serious and even life-threatening complications if the clot travels to the lungs. Symptoms include swelling of the arm or leg, warmth and redness of the arm or leg, and pain. Treatment may include taking a blood thinning medication; make sure to take anything prescribed as instructed.    SEEK IMMEDIATE MEDICAL CARE IF YOU HAVE ANY OF THE FOLLOWING SYMPTOMS: shortness of breath, chest pain, rapid or irregular heartbeat, lightheadedness/dizziness, coughing up blood, or any bleeding in your vomit, stool, or urine. These symptoms may represent a serious problem that is an emergency. Do not wait to see if the symptoms will go away. Call 911 and do not drive yourself to the hospital.

## 2021-03-29 NOTE — ED STATDOCS - CLINICAL SUMMARY MEDICAL DECISION MAKING FREE TEXT BOX
Pt with diagnosed outpatient DVT, pt with no history of renal dysfunction, had intermittent rectal bleeding x2 months ago, will check basic labs prior to starting DOAC. Pt with diagnosed outpatient DVT, pt with no history of renal dysfunction, had intermittent rectal bleeding a few months ago, will check basic labs prior to starting DOAC.

## 2021-03-29 NOTE — ED STATDOCS - ATTENDING CONTRIBUTION TO CARE
I, Deneen Kinsey, performed the initial face to face bedside interview with this patient regarding history of present illness, review of symptoms and relevant past medical, social and family history.  I completed an independent physical examination.   The medical decision making and follow-up on ordered tests (ie labs, radiologic studies) and re-evaluation of the patient's status has been communicated to the ACP.  Disposition of the patient will be based on test outcome and response to ED interventions.  The history, relevant review of systems, past medical and surgical history, medical decision making, and physical examination was documented by the scribe in my presence and I attest to the accuracy of the documentation.

## 2021-03-29 NOTE — ED STATDOCS - NS ED ROS FT
ROS: no CP/SOB. no cough. no fever. no n/v/d/c. no abd pain. no rash. no bleeding. no urinary complaints. no weakness. no vision changes. no HA. no neck/back pain. (+) R calf tenderness. No change in mental status.

## 2021-03-30 LAB
COVID-19 NUCLEOCAPSID GAM AB INTERP: POSITIVE
COVID-19 NUCLEOCAPSID TOTAL GAM ANTIBODY RESULT: 198 INDEX — HIGH
SARS-COV-2 IGG+IGM SERPL QL IA: 198 INDEX — HIGH
SARS-COV-2 IGG+IGM SERPL QL IA: POSITIVE

## 2021-05-12 ENCOUNTER — EMERGENCY (EMERGENCY)
Facility: HOSPITAL | Age: 46
LOS: 1 days | Discharge: HOME | End: 2021-05-12
Payer: COMMERCIAL

## 2021-05-12 VITALS
RESPIRATION RATE: 18 BRPM | OXYGEN SATURATION: 99 % | TEMPERATURE: 98 F | WEIGHT: 149.03 LBS | HEART RATE: 84 BPM | SYSTOLIC BLOOD PRESSURE: 168 MMHG | HEIGHT: 59 IN | DIASTOLIC BLOOD PRESSURE: 99 MMHG

## 2021-05-12 DIAGNOSIS — Z98.89 OTHER SPECIFIED POSTPROCEDURAL STATES: Chronic | ICD-10-CM

## 2021-05-12 DIAGNOSIS — Z90.710 ACQUIRED ABSENCE OF BOTH CERVIX AND UTERUS: Chronic | ICD-10-CM

## 2021-05-12 LAB — SARS-COV-2 RNA SPEC QL NAA+PROBE: SIGNIFICANT CHANGE UP

## 2021-05-12 PROCEDURE — U0005: CPT

## 2021-05-12 PROCEDURE — 99282 EMERGENCY DEPT VISIT SF MDM: CPT

## 2021-05-12 PROCEDURE — 99283 EMERGENCY DEPT VISIT LOW MDM: CPT

## 2021-05-12 PROCEDURE — U0003: CPT

## 2021-05-12 NOTE — ED PROVIDER NOTE - NS ED ROS FT
Denies fever, chills, fatigue, and weight loss. Denies HA, Dizziness.  CARDIO: Denies CP, palpitations, edema. RESP: Denies Cough, SOB, Diff breathing, hemoptysis. GI: Denies N/V, ABD pain, change in bowel movement.. MS: Denies joint pain, back pain, weakness, decreased ROM, swelling. NEURO: Denies change in gait, seizures, loss of sensation, dizziness, confusion LOC. SKIN: denies rash or discoloration

## 2021-05-12 NOTE — ED PROVIDER NOTE - OBJECTIVE STATEMENT
Pt presenting to the ER for COVID-19 testing. Denies fevers chills, loss of taste or smell, URI symptoms, chest pain or shortness of breath, nausea vomiting diarrhea abdominal pain, weakness or fatigue. Eating and drinking normal diet. Normal output. Pt requesting testing at this time. [X] known exposure 1 week ago

## 2021-05-12 NOTE — ED PROVIDER NOTE - PATIENT PORTAL LINK FT
You can access the FollowMyHealth Patient Portal offered by [facility] by registering at the following website: [email or web address]. By joining [employer]’s FollowMyHealth portal, you will also be able to view your health information using other applications (apps) compatible with our system.

## 2021-05-17 DIAGNOSIS — Z20.822 CONTACT WITH AND (SUSPECTED) EXPOSURE TO COVID-19: ICD-10-CM

## 2021-11-19 ENCOUNTER — EMERGENCY (EMERGENCY)
Facility: HOSPITAL | Age: 46
LOS: 1 days | Discharge: HOME | End: 2021-11-19
Attending: EMERGENCY MEDICINE
Payer: COMMERCIAL

## 2021-11-19 VITALS
OXYGEN SATURATION: 99 % | WEIGHT: 154.1 LBS | HEART RATE: 111 BPM | RESPIRATION RATE: 18 BRPM | TEMPERATURE: 98 F | SYSTOLIC BLOOD PRESSURE: 176 MMHG | DIASTOLIC BLOOD PRESSURE: 112 MMHG | HEIGHT: 59 IN

## 2021-11-19 VITALS
HEART RATE: 90 BPM | TEMPERATURE: 98 F | RESPIRATION RATE: 18 BRPM | OXYGEN SATURATION: 99 % | SYSTOLIC BLOOD PRESSURE: 152 MMHG | DIASTOLIC BLOOD PRESSURE: 89 MMHG

## 2021-11-19 DIAGNOSIS — Z90.710 ACQUIRED ABSENCE OF BOTH CERVIX AND UTERUS: Chronic | ICD-10-CM

## 2021-11-19 DIAGNOSIS — Z98.89 OTHER SPECIFIED POSTPROCEDURAL STATES: Chronic | ICD-10-CM

## 2021-11-19 LAB
ALBUMIN SERPL ELPH-MCNC: 4.1 G/DL — SIGNIFICANT CHANGE UP (ref 3.3–5.2)
ALP SERPL-CCNC: 89 U/L — SIGNIFICANT CHANGE UP (ref 40–120)
ALT FLD-CCNC: 26 U/L — SIGNIFICANT CHANGE UP
ANION GAP SERPL CALC-SCNC: 14 MMOL/L — SIGNIFICANT CHANGE UP (ref 5–17)
AST SERPL-CCNC: 23 U/L — SIGNIFICANT CHANGE UP
BASOPHILS # BLD AUTO: 0.08 K/UL — SIGNIFICANT CHANGE UP (ref 0–0.2)
BASOPHILS NFR BLD AUTO: 0.5 % — SIGNIFICANT CHANGE UP (ref 0–2)
BILIRUB SERPL-MCNC: <0.2 MG/DL — LOW (ref 0.4–2)
BUN SERPL-MCNC: 10.9 MG/DL — SIGNIFICANT CHANGE UP (ref 8–20)
CALCIUM SERPL-MCNC: 9.1 MG/DL — SIGNIFICANT CHANGE UP (ref 8.6–10.2)
CHLORIDE SERPL-SCNC: 98 MMOL/L — SIGNIFICANT CHANGE UP (ref 98–107)
CO2 SERPL-SCNC: 23 MMOL/L — SIGNIFICANT CHANGE UP (ref 22–29)
CREAT SERPL-MCNC: 0.75 MG/DL — SIGNIFICANT CHANGE UP (ref 0.5–1.3)
D DIMER BLD IA.RAPID-MCNC: <150 NG/ML DDU — SIGNIFICANT CHANGE UP
EOSINOPHIL # BLD AUTO: 0.13 K/UL — SIGNIFICANT CHANGE UP (ref 0–0.5)
EOSINOPHIL NFR BLD AUTO: 0.9 % — SIGNIFICANT CHANGE UP (ref 0–6)
GLUCOSE SERPL-MCNC: 180 MG/DL — HIGH (ref 70–99)
HCT VFR BLD CALC: 37.3 % — SIGNIFICANT CHANGE UP (ref 34.5–45)
HGB BLD-MCNC: 12.8 G/DL — SIGNIFICANT CHANGE UP (ref 11.5–15.5)
IMM GRANULOCYTES NFR BLD AUTO: 0.8 % — SIGNIFICANT CHANGE UP (ref 0–1.5)
LYMPHOCYTES # BLD AUTO: 16.5 % — SIGNIFICANT CHANGE UP (ref 13–44)
LYMPHOCYTES # BLD AUTO: 2.4 K/UL — SIGNIFICANT CHANGE UP (ref 1–3.3)
MCHC RBC-ENTMCNC: 31.1 PG — SIGNIFICANT CHANGE UP (ref 27–34)
MCHC RBC-ENTMCNC: 34.3 GM/DL — SIGNIFICANT CHANGE UP (ref 32–36)
MCV RBC AUTO: 90.5 FL — SIGNIFICANT CHANGE UP (ref 80–100)
MONOCYTES # BLD AUTO: 0.66 K/UL — SIGNIFICANT CHANGE UP (ref 0–0.9)
MONOCYTES NFR BLD AUTO: 4.5 % — SIGNIFICANT CHANGE UP (ref 2–14)
NEUTROPHILS # BLD AUTO: 11.19 K/UL — HIGH (ref 1.8–7.4)
NEUTROPHILS NFR BLD AUTO: 76.8 % — SIGNIFICANT CHANGE UP (ref 43–77)
PLATELET # BLD AUTO: 457 K/UL — HIGH (ref 150–400)
POTASSIUM SERPL-MCNC: 4.1 MMOL/L — SIGNIFICANT CHANGE UP (ref 3.5–5.3)
POTASSIUM SERPL-SCNC: 4.1 MMOL/L — SIGNIFICANT CHANGE UP (ref 3.5–5.3)
PROT SERPL-MCNC: 7.8 G/DL — SIGNIFICANT CHANGE UP (ref 6.6–8.7)
RBC # BLD: 4.12 M/UL — SIGNIFICANT CHANGE UP (ref 3.8–5.2)
RBC # FLD: 12.5 % — SIGNIFICANT CHANGE UP (ref 10.3–14.5)
SODIUM SERPL-SCNC: 135 MMOL/L — SIGNIFICANT CHANGE UP (ref 135–145)
TROPONIN T SERPL-MCNC: <0.01 NG/ML — SIGNIFICANT CHANGE UP (ref 0–0.06)
WBC # BLD: 14.57 K/UL — HIGH (ref 3.8–10.5)
WBC # FLD AUTO: 14.57 K/UL — HIGH (ref 3.8–10.5)

## 2021-11-19 PROCEDURE — 99284 EMERGENCY DEPT VISIT MOD MDM: CPT | Mod: 25

## 2021-11-19 PROCEDURE — 84484 ASSAY OF TROPONIN QUANT: CPT

## 2021-11-19 PROCEDURE — 36415 COLL VENOUS BLD VENIPUNCTURE: CPT

## 2021-11-19 PROCEDURE — 93010 ELECTROCARDIOGRAM REPORT: CPT

## 2021-11-19 PROCEDURE — 85379 FIBRIN DEGRADATION QUANT: CPT

## 2021-11-19 PROCEDURE — 80053 COMPREHEN METABOLIC PANEL: CPT

## 2021-11-19 PROCEDURE — 71045 X-RAY EXAM CHEST 1 VIEW: CPT | Mod: 26

## 2021-11-19 PROCEDURE — 99285 EMERGENCY DEPT VISIT HI MDM: CPT

## 2021-11-19 PROCEDURE — 96374 THER/PROPH/DIAG INJ IV PUSH: CPT

## 2021-11-19 PROCEDURE — 85025 COMPLETE CBC W/AUTO DIFF WBC: CPT

## 2021-11-19 PROCEDURE — 93005 ELECTROCARDIOGRAM TRACING: CPT

## 2021-11-19 PROCEDURE — 71045 X-RAY EXAM CHEST 1 VIEW: CPT

## 2021-11-19 RX ORDER — KETOROLAC TROMETHAMINE 30 MG/ML
15 SYRINGE (ML) INJECTION ONCE
Refills: 0 | Status: DISCONTINUED | OUTPATIENT
Start: 2021-11-19 | End: 2021-11-19

## 2021-11-19 RX ADMIN — Medication 15 MILLIGRAM(S): at 23:09

## 2021-11-19 RX ADMIN — Medication 15 MILLIGRAM(S): at 22:39

## 2021-11-19 NOTE — ED PROVIDER NOTE - PATIENT PORTAL LINK FT
You can access the FollowMyHealth Patient Portal offered by [facility] by registering at the following website: [email or web address]. By joining [employer]’s FollowMyHealth portal, you will also be able to view your health information using other applications (apps) compatible with our system.

## 2021-11-19 NOTE — ED ADULT NURSE NOTE - OBJECTIVE STATEMENT
Patient A&O presents to the ED c.o chest pain that started this morning, subside and then was worse and constant at around 1630 today.  Patient has hx of HTN.  Patient reports recent right knee injury with hx of blood clot in right leg was previously on xarelto. Patient states that she no longer takes xarelto and had a test done that showed resolution of blood clot in leg.

## 2021-11-19 NOTE — ED ADULT NURSE NOTE - NSIMPLEMENTINTERV_GEN_ALL_ED
Implemented All Fall Risk Interventions:  [place] to call system. Call bell, personal items and telephone within reach. Instruct patient to call for assistance. Room bathroom lighting operational. Non-slip footwear when patient is off stretcher. Physically safe environment: no spills, clutter or unnecessary equipment. Stretcher in lowest position, wheels locked, appropriate side rails in place. Provide visual cue, wrist band, yellow gown, etc. Monitor gait and stability. Monitor for mental status changes and reorient to person, place, and time. Review medications for side effects contributing to fall risk. Reinforce activity limits and safety measures with patient and family.

## 2021-11-19 NOTE — ED PROVIDER NOTE - OBJECTIVE STATEMENT
47 Y/O female w/ PMHx. of asthma and HTN presents to ED c/o pinching chest pain starting around 10am today while sitting at home. PT c/o worsening pain w/ inspiration and mild cough but denies fever, chills, n/v/d, abdominal pain, past cardiac history. PT reports recent knee injury resulting in decreased activity.

## 2021-11-19 NOTE — ED ADULT NURSE NOTE - NSICDXPASTSURGICALHX_GEN_ALL_CORE_FT
PAST SURGICAL HISTORY:  History of appendectomy     History of cholecystectomy     History of hysterectomy

## 2021-11-19 NOTE — ED PROVIDER NOTE - CROS ED RESP ALL NEG
Physical Therapy Treatment Note     Name: Alessandra LIN [facility] Number: [id number]    Therapy Diagnosis:   Encounter Diagnosis   Name Primary?    Chronic left shoulder pain Yes     Physician: Melo Patel MD    Visit Date: 7/31/2020    Physician Orders: PT Eval and Treat   Medical Diagnosis from Referral: Chronic left shoulder pain  Evaluation Date: 7/24/2020  Authorization Period Expiration: 7/31/2021  Plan of Care Expiration: 8/24/2020  Visit # / Visits authorized: 2/20     Time In: 9:25 am  Time Out: 10:05 am  Total Billable Time: 38 minutes    Precautions: blood thinners    Subjective     Pt reports: She has no shoulder pain today, only stiffness.  She was compliant with home exercise program.  Response to previous treatment: decreased pain  Functional change: Decreased pain    Pain: 0/10  Location: left shoulder      Objective     Alessandra received therapeutic exercises to develop strength, endurance and posture for 38 minutes including:  UBE FWD/REV with cues for posture and scapular retraction in reverse  Overhead pulleys 2 min  Standing shoulder ext with wall tubing (GRN)  Standing T with wall tubing (GRN)  (B) ER with RTB  (B) shoulder horizontal abduction on 1/2 roll (standing)  Matrix rows 25# 2/10  Doorway stretch 15 sec hold x5  Supine wand shoulder flexion 2/10  Prone T  Prone scapular retraction    Home Exercises Provided and Patient Education Provided     Education provided:   - Home program review    Written Home Exercises Provided: yes.  Exercises were reviewed and Alessandra was able to demonstrate them prior to the end of the session.  Alessandra demonstrated good  understanding of the education provided.     See EMR under Patient Instructions for exercises provided prior visit.    Assessment     The patient presents with no pain today.  She reports some tightness during exercises.   She was instructed in program for strengthen, posture, AROM.   No increase in symptoms during therap  Alessandra  is progressing well towards her goals.   Pt prognosis is Excellent.     Pt will continue to benefit from skilled outpatient physical therapy to address the deficits listed in the problem list box on initial evaluation, provide pt/family education and to maximize pt's level of independence in the home and community environment.     Pt's spiritual, cultural and educational needs considered and pt agreeable to plan of care and goals.     Anticipated barriers to physical therapy:none    Goals:   Short Term Goals: In 3 weeks   1.I with HEP  2.Patient to increase MMT strength from 4/5 to 5/5   3.Patient to have pain less than 3/10 at all times.     Long Term Goals: In 6 weeks  1. Patient to have decreased pain to 1/10 at all times.  4. Patient to demo increase GH ROM to 170 degrees flexion  5. Patient to perform daily activities including picking up objects and reaching without limitation.    Plan     Plan of care Certification: 7/24/2020 to 8/24/2020.     Outpatient Physical Therapy 2 times weekly for 6 weeks to include the following interventions: Electrical Stimulation to shoulder, Manual Therapy, Moist Heat/ Ice, Neuromuscular Re-ed, Patient Education, Therapeutic Activites, Therapeutic Exercise, Ultrasound and dry needling.     Ricardo Ruiz, PT      negative...

## 2022-06-28 ENCOUNTER — EMERGENCY (EMERGENCY)
Facility: HOSPITAL | Age: 47
LOS: 1 days | Discharge: HOME | End: 2022-06-28
Attending: STUDENT IN AN ORGANIZED HEALTH CARE EDUCATION/TRAINING PROGRAM
Payer: COMMERCIAL

## 2022-06-28 VITALS
SYSTOLIC BLOOD PRESSURE: 210 MMHG | RESPIRATION RATE: 18 BRPM | TEMPERATURE: 99 F | DIASTOLIC BLOOD PRESSURE: 113 MMHG | HEIGHT: 59 IN | OXYGEN SATURATION: 99 % | HEART RATE: 78 BPM | WEIGHT: 151.46 LBS

## 2022-06-28 VITALS
TEMPERATURE: 98 F | RESPIRATION RATE: 18 BRPM | DIASTOLIC BLOOD PRESSURE: 89 MMHG | HEART RATE: 68 BPM | SYSTOLIC BLOOD PRESSURE: 175 MMHG | OXYGEN SATURATION: 98 %

## 2022-06-28 DIAGNOSIS — Z90.710 ACQUIRED ABSENCE OF BOTH CERVIX AND UTERUS: Chronic | ICD-10-CM

## 2022-06-28 DIAGNOSIS — Z98.89 OTHER SPECIFIED POSTPROCEDURAL STATES: Chronic | ICD-10-CM

## 2022-06-28 DIAGNOSIS — Z98.891 HISTORY OF UTERINE SCAR FROM PREVIOUS SURGERY: Chronic | ICD-10-CM

## 2022-06-28 LAB
ALBUMIN SERPL ELPH-MCNC: 4.6 G/DL — SIGNIFICANT CHANGE UP (ref 3.3–5.2)
ALP SERPL-CCNC: 71 U/L — SIGNIFICANT CHANGE UP (ref 40–120)
ALT FLD-CCNC: 31 U/L — SIGNIFICANT CHANGE UP
ANION GAP SERPL CALC-SCNC: 13 MMOL/L — SIGNIFICANT CHANGE UP (ref 5–17)
APTT BLD: 29.4 SEC — SIGNIFICANT CHANGE UP (ref 27.5–35.5)
AST SERPL-CCNC: 25 U/L — SIGNIFICANT CHANGE UP
BASOPHILS # BLD AUTO: 0.05 K/UL — SIGNIFICANT CHANGE UP (ref 0–0.2)
BASOPHILS NFR BLD AUTO: 0.6 % — SIGNIFICANT CHANGE UP (ref 0–2)
BILIRUB SERPL-MCNC: 0.2 MG/DL — LOW (ref 0.4–2)
BUN SERPL-MCNC: 10 MG/DL — SIGNIFICANT CHANGE UP (ref 8–20)
CALCIUM SERPL-MCNC: 9 MG/DL — SIGNIFICANT CHANGE UP (ref 8.6–10.2)
CHLORIDE SERPL-SCNC: 100 MMOL/L — SIGNIFICANT CHANGE UP (ref 98–107)
CO2 SERPL-SCNC: 23 MMOL/L — SIGNIFICANT CHANGE UP (ref 22–29)
CREAT SERPL-MCNC: 0.64 MG/DL — SIGNIFICANT CHANGE UP (ref 0.5–1.3)
EGFR: 110 ML/MIN/1.73M2 — SIGNIFICANT CHANGE UP
EOSINOPHIL # BLD AUTO: 0.26 K/UL — SIGNIFICANT CHANGE UP (ref 0–0.5)
EOSINOPHIL NFR BLD AUTO: 3.1 % — SIGNIFICANT CHANGE UP (ref 0–6)
GLUCOSE SERPL-MCNC: 119 MG/DL — HIGH (ref 70–99)
HCT VFR BLD CALC: 38.6 % — SIGNIFICANT CHANGE UP (ref 34.5–45)
HGB BLD-MCNC: 13.5 G/DL — SIGNIFICANT CHANGE UP (ref 11.5–15.5)
IMM GRANULOCYTES NFR BLD AUTO: 0.6 % — SIGNIFICANT CHANGE UP (ref 0–1.5)
INR BLD: 1.03 RATIO — SIGNIFICANT CHANGE UP (ref 0.88–1.16)
LYMPHOCYTES # BLD AUTO: 2.61 K/UL — SIGNIFICANT CHANGE UP (ref 1–3.3)
LYMPHOCYTES # BLD AUTO: 30.8 % — SIGNIFICANT CHANGE UP (ref 13–44)
MAGNESIUM SERPL-MCNC: 2.1 MG/DL — SIGNIFICANT CHANGE UP (ref 1.6–2.6)
MCHC RBC-ENTMCNC: 31.5 PG — SIGNIFICANT CHANGE UP (ref 27–34)
MCHC RBC-ENTMCNC: 35 GM/DL — SIGNIFICANT CHANGE UP (ref 32–36)
MCV RBC AUTO: 90 FL — SIGNIFICANT CHANGE UP (ref 80–100)
MONOCYTES # BLD AUTO: 0.57 K/UL — SIGNIFICANT CHANGE UP (ref 0–0.9)
MONOCYTES NFR BLD AUTO: 6.7 % — SIGNIFICANT CHANGE UP (ref 2–14)
NEUTROPHILS # BLD AUTO: 4.93 K/UL — SIGNIFICANT CHANGE UP (ref 1.8–7.4)
NEUTROPHILS NFR BLD AUTO: 58.2 % — SIGNIFICANT CHANGE UP (ref 43–77)
NT-PROBNP SERPL-SCNC: 38 PG/ML — SIGNIFICANT CHANGE UP (ref 0–300)
PLATELET # BLD AUTO: 366 K/UL — SIGNIFICANT CHANGE UP (ref 150–400)
POTASSIUM SERPL-MCNC: 3.8 MMOL/L — SIGNIFICANT CHANGE UP (ref 3.5–5.3)
POTASSIUM SERPL-SCNC: 3.8 MMOL/L — SIGNIFICANT CHANGE UP (ref 3.5–5.3)
PROT SERPL-MCNC: 7.9 G/DL — SIGNIFICANT CHANGE UP (ref 6.6–8.7)
PROTHROM AB SERPL-ACNC: 12 SEC — SIGNIFICANT CHANGE UP (ref 10.5–13.4)
RBC # BLD: 4.29 M/UL — SIGNIFICANT CHANGE UP (ref 3.8–5.2)
RBC # FLD: 12.5 % — SIGNIFICANT CHANGE UP (ref 10.3–14.5)
SODIUM SERPL-SCNC: 136 MMOL/L — SIGNIFICANT CHANGE UP (ref 135–145)
TROPONIN T SERPL-MCNC: <0.01 NG/ML — SIGNIFICANT CHANGE UP (ref 0–0.06)
WBC # BLD: 8.47 K/UL — SIGNIFICANT CHANGE UP (ref 3.8–10.5)
WBC # FLD AUTO: 8.47 K/UL — SIGNIFICANT CHANGE UP (ref 3.8–10.5)

## 2022-06-28 PROCEDURE — 84484 ASSAY OF TROPONIN QUANT: CPT

## 2022-06-28 PROCEDURE — 85610 PROTHROMBIN TIME: CPT

## 2022-06-28 PROCEDURE — 36415 COLL VENOUS BLD VENIPUNCTURE: CPT

## 2022-06-28 PROCEDURE — 83735 ASSAY OF MAGNESIUM: CPT

## 2022-06-28 PROCEDURE — 93005 ELECTROCARDIOGRAM TRACING: CPT

## 2022-06-28 PROCEDURE — 83880 ASSAY OF NATRIURETIC PEPTIDE: CPT

## 2022-06-28 PROCEDURE — 80053 COMPREHEN METABOLIC PANEL: CPT

## 2022-06-28 PROCEDURE — 71045 X-RAY EXAM CHEST 1 VIEW: CPT

## 2022-06-28 PROCEDURE — 96374 THER/PROPH/DIAG INJ IV PUSH: CPT

## 2022-06-28 PROCEDURE — 85025 COMPLETE CBC W/AUTO DIFF WBC: CPT

## 2022-06-28 PROCEDURE — 99285 EMERGENCY DEPT VISIT HI MDM: CPT

## 2022-06-28 PROCEDURE — 99285 EMERGENCY DEPT VISIT HI MDM: CPT | Mod: 25

## 2022-06-28 PROCEDURE — 85730 THROMBOPLASTIN TIME PARTIAL: CPT

## 2022-06-28 PROCEDURE — 93010 ELECTROCARDIOGRAM REPORT: CPT

## 2022-06-28 PROCEDURE — 70450 CT HEAD/BRAIN W/O DYE: CPT | Mod: 26,MA

## 2022-06-28 PROCEDURE — 71045 X-RAY EXAM CHEST 1 VIEW: CPT | Mod: 26

## 2022-06-28 PROCEDURE — 96375 TX/PRO/DX INJ NEW DRUG ADDON: CPT

## 2022-06-28 PROCEDURE — 70450 CT HEAD/BRAIN W/O DYE: CPT | Mod: MA

## 2022-06-28 RX ORDER — METOCLOPRAMIDE HCL 10 MG
10 TABLET ORAL ONCE
Refills: 0 | Status: COMPLETED | OUTPATIENT
Start: 2022-06-28 | End: 2022-06-28

## 2022-06-28 RX ORDER — KETOROLAC TROMETHAMINE 30 MG/ML
15 SYRINGE (ML) INJECTION ONCE
Refills: 0 | Status: DISCONTINUED | OUTPATIENT
Start: 2022-06-28 | End: 2022-06-28

## 2022-06-28 RX ORDER — AMLODIPINE BESYLATE 2.5 MG/1
5 TABLET ORAL ONCE
Refills: 0 | Status: COMPLETED | OUTPATIENT
Start: 2022-06-28 | End: 2022-06-28

## 2022-06-28 RX ADMIN — Medication 10 MILLIGRAM(S): at 20:30

## 2022-06-28 RX ADMIN — AMLODIPINE BESYLATE 5 MILLIGRAM(S): 2.5 TABLET ORAL at 22:02

## 2022-06-28 RX ADMIN — Medication 15 MILLIGRAM(S): at 22:02

## 2022-06-28 NOTE — ED ADULT NURSE NOTE - NSIMPLEMENTINTERV_GEN_ALL_ED
Implemented All Universal Safety Interventions:  [place] to call system. Call bell, personal items and telephone within reach. Instruct patient to call for assistance. Room bathroom lighting operational. Non-slip footwear when patient is off stretcher. Physically safe environment: no spills, clutter or unnecessary equipment. Stretcher in lowest position, wheels locked, appropriate side rails in place.

## 2022-06-28 NOTE — ED PROVIDER NOTE - CLINICAL SUMMARY MEDICAL DECISION MAKING FREE TEXT BOX
45yo F w/ PMHx HTN presenting w/ cc of HA and elevated home BP. ED vitals significant for /102 L arm and 211/111 R arm. Symptoms of HA and blurry vision concerning for possible hypertensive emergency. Will evaluate for evidence of end organ damage. Plan to f/u head CT, CXR, EKG, and labs e.g. trop, CMP, and urine. Will treat w/ antihypertensives. 47yo F w/ PMHx HTN presenting w/ cc of HA and elevated home BP. ED vitals significant for /102 L arm and 211/111 R arm. Symptoms of HA and blurry vision concerning for possible hypertensive emergency. Will evaluate for evidence of end organ damage. Plan to f/u head CT, CXR, EKG, and labs e.g. trop, CMP, and urine. Pt's BP decreased without BP meds and then given in order to improve BP, pt HA improved and pt with no evidence of end organ damage, CTH wnl, Pt denies any chest pain or sob, well appearing stable for dc with follow up

## 2022-06-28 NOTE — ED PROVIDER NOTE - PATIENT PORTAL LINK FT
You can access the FollowMyHealth Patient Portal offered by [facility] by registering at the following website: [email or web address]. By joining [employer]’s FollowMyHealth portal, you will also be able to view your health information using other applications (apps) compatible with our system.

## 2022-06-28 NOTE — ED ADULT NURSE NOTE - NSICDXPASTMEDICALHX_GEN_ALL_CORE_FT
PAST MEDICAL HISTORY:  Hyperlipidemia      PAST MEDICAL HISTORY:  Hyperlipidemia     Hypertension

## 2022-06-28 NOTE — ED PROVIDER NOTE - OBJECTIVE STATEMENT
45yo F w/ PMHx HTN and DVT presenting w/ cc of HA and elevated home BP. Yesterday pt. states she developed a mild HA but today it gradually worsened to 9/10. Describes HA as throbbing and primarily occipital w/ radiation to forehead w/ associated photophobia. Reports 1 episode of blurry vision today lasting a few seconds. Home BP was 180/116. Took her amlodipine 5mg which is her daily antihypertensive dose as well as Tylenol w/o relief of HA. Denies CP, SOB, abdominal pain/distension, N/V, and pedal edema. Of note, pt. reports recent illness. Had fever 6/25-6/26 and cough. Took Nyquil yesterday.

## 2022-06-28 NOTE — ED PROVIDER NOTE - PHYSICAL EXAMINATION
A&Ox3, moving all extremities symmetrically, follows commands, motor helen upper and lower ext 5/5, sensory symm and intact CN 2-12 grossly intact, no ataxia, no nystagmus, no dysmetria, no ddk, symm helen, no pronator drift

## 2022-06-28 NOTE — ED ADULT NURSE NOTE - NSICDXPASTSURGICALHX_GEN_ALL_CORE_FT
PAST SURGICAL HISTORY:  History of appendectomy     History of cholecystectomy     History of hysterectomy      PAST SURGICAL HISTORY:  H/O  section     History of appendectomy     History of cholecystectomy     History of hysterectomy

## 2022-08-19 PROBLEM — I10 ESSENTIAL (PRIMARY) HYPERTENSION: Chronic | Status: ACTIVE | Noted: 2022-06-28

## 2022-08-22 ENCOUNTER — NON-APPOINTMENT (OUTPATIENT)
Age: 47
End: 2022-08-22

## 2022-08-23 ENCOUNTER — APPOINTMENT (OUTPATIENT)
Dept: UROLOGY | Facility: CLINIC | Age: 47
End: 2022-08-23

## 2022-08-23 VITALS
WEIGHT: 151 LBS | HEIGHT: 59 IN | HEART RATE: 76 BPM | BODY MASS INDEX: 30.44 KG/M2 | DIASTOLIC BLOOD PRESSURE: 114 MMHG | SYSTOLIC BLOOD PRESSURE: 159 MMHG

## 2022-08-23 DIAGNOSIS — R31.0 GROSS HEMATURIA: ICD-10-CM

## 2022-08-23 DIAGNOSIS — Z83.3 FAMILY HISTORY OF DIABETES MELLITUS: ICD-10-CM

## 2022-08-23 DIAGNOSIS — Z82.49 FAMILY HISTORY OF ISCHEMIC HEART DISEASE AND OTHER DISEASES OF THE CIRCULATORY SYSTEM: ICD-10-CM

## 2022-08-23 DIAGNOSIS — N39.0 URINARY TRACT INFECTION, SITE NOT SPECIFIED: ICD-10-CM

## 2022-08-23 DIAGNOSIS — Z82.3 FAMILY HISTORY OF STROKE: ICD-10-CM

## 2022-08-23 DIAGNOSIS — R30.0 DYSURIA: ICD-10-CM

## 2022-08-23 LAB
BILIRUB UR QL STRIP: NORMAL
CLARITY UR: CLEAR
COLLECTION METHOD: NORMAL
GLUCOSE UR-MCNC: NORMAL
HCG UR QL: 1 EU/DL
HGB UR QL STRIP.AUTO: NORMAL
KETONES UR-MCNC: NORMAL
LEUKOCYTE ESTERASE UR QL STRIP: ABNORMAL
NITRITE UR QL STRIP: NORMAL
PH UR STRIP: 6.5
PROT UR STRIP-MCNC: NORMAL
SP GR UR STRIP: 1.02

## 2022-08-23 PROCEDURE — 99204 OFFICE O/P NEW MOD 45 MIN: CPT

## 2022-08-23 PROCEDURE — 51798 US URINE CAPACITY MEASURE: CPT

## 2022-08-23 RX ORDER — AMLODIPINE BESYLATE 10 MG/1
10 TABLET ORAL
Refills: 0 | Status: ACTIVE | COMMUNITY
Start: 2022-08-23

## 2022-08-23 RX ORDER — MELOXICAM 15 MG/1
15 TABLET ORAL DAILY
Qty: 14 | Refills: 0 | Status: DISCONTINUED | COMMUNITY
Start: 2021-01-19 | End: 2022-08-23

## 2022-08-23 RX ORDER — FLUCONAZOLE 150 MG/1
150 TABLET ORAL
Refills: 0 | Status: DISCONTINUED | COMMUNITY
Start: 2022-08-23 | End: 2022-08-23

## 2022-08-23 RX ORDER — NORTRIPTYLINE HYDROCHLORIDE 25 MG/1
25 CAPSULE ORAL
Qty: 30 | Refills: 1 | Status: DISCONTINUED | COMMUNITY
Start: 2018-03-29 | End: 2022-08-23

## 2022-08-23 RX ORDER — METRONIDAZOLE 250 MG/1
250 TABLET ORAL
Refills: 0 | Status: ACTIVE | COMMUNITY
Start: 2022-08-23

## 2022-08-23 NOTE — PHYSICAL EXAM
[General Appearance - Well Developed] : well developed [General Appearance - Well Nourished] : well nourished [Normal Appearance] : normal appearance [Well Groomed] : well groomed [General Appearance - In No Acute Distress] : no acute distress [Edema] : no peripheral edema [] : no respiratory distress [Respiration, Rhythm And Depth] : normal respiratory rhythm and effort [Exaggerated Use Of Accessory Muscles For Inspiration] : no accessory muscle use [Normal Station and Gait] : the gait and station were normal for the patient's age [Skin Color & Pigmentation] : normal skin color and pigmentation [No Focal Deficits] : no focal deficits [Oriented To Time, Place, And Person] : oriented to person, place, and time [Affect] : the affect was normal [Mood] : the mood was normal [Not Anxious] : not anxious

## 2022-08-23 NOTE — LETTER BODY
[Dear  ___] : Dear  [unfilled], [Consult Letter:] : I had the pleasure of evaluating your patient, [unfilled]. [Please see my note below.] : Please see my note below. [Consult Closing:] : Thank you very much for allowing me to participate in the care of this patient.  If you have any questions, please do not hesitate to contact me. [FreeTextEntry2] : Dr. Jovi Flores\par [address] [place] [address]\[place] [address]

## 2022-08-23 NOTE — ASSESSMENT
[FreeTextEntry1] : Gross hematuria, UTI, dysuria\par \par Urine dip> small leuks\par Obtained results of labs from PCP office> Urine culture + E. Coli\par Rx: Keflex 500 mg x 7 days\par Will send for renal/bladder US\par PVR 2 ml\par Discussed Cystoscopy and pt agreeable to proceed\par Schedule Cysto\par RTO 2 weeks, repeat U/A

## 2022-08-23 NOTE — HISTORY OF PRESENT ILLNESS
[FreeTextEntry1] : Referred by PCP Dr. Jovi Flores for gross hematuria. States 2 weeks ago she noted a lot of blood on toilet paper and pain from urethra 2 days after having intercourse, frequency every 20 minutes. At present she feels slight discomfort to urethra, does not always feel that she empties her bladder. States PCP ordered cytology and a culture. She is on Flagyl for a "vaginal infection".\par \par \par LMP 2012\par H/o hysterectomy\par \par Nonsmoker

## 2022-09-01 ENCOUNTER — OUTPATIENT (OUTPATIENT)
Dept: OUTPATIENT SERVICES | Facility: HOSPITAL | Age: 47
LOS: 1 days | End: 2022-09-01

## 2022-09-01 ENCOUNTER — APPOINTMENT (OUTPATIENT)
Dept: ULTRASOUND IMAGING | Facility: CLINIC | Age: 47
End: 2022-09-01

## 2022-09-01 DIAGNOSIS — Z90.710 ACQUIRED ABSENCE OF BOTH CERVIX AND UTERUS: Chronic | ICD-10-CM

## 2022-09-01 DIAGNOSIS — Z98.891 HISTORY OF UTERINE SCAR FROM PREVIOUS SURGERY: Chronic | ICD-10-CM

## 2022-09-01 DIAGNOSIS — Z98.89 OTHER SPECIFIED POSTPROCEDURAL STATES: Chronic | ICD-10-CM

## 2022-09-01 DIAGNOSIS — R31.0 GROSS HEMATURIA: ICD-10-CM

## 2022-09-01 PROCEDURE — 76770 US EXAM ABDO BACK WALL COMP: CPT | Mod: 26

## 2022-09-06 ENCOUNTER — APPOINTMENT (OUTPATIENT)
Dept: UROLOGY | Facility: CLINIC | Age: 47
End: 2022-09-06

## 2022-09-06 LAB
BILIRUB UR QL STRIP: NORMAL
CLARITY UR: CLEAR
COLLECTION METHOD: NORMAL
GLUCOSE UR-MCNC: NORMAL
HCG UR QL: 0.2 EU/DL
HGB UR QL STRIP.AUTO: NORMAL
KETONES UR-MCNC: NORMAL
LEUKOCYTE ESTERASE UR QL STRIP: NORMAL
NITRITE UR QL STRIP: NORMAL
PH UR STRIP: 6.5
PROT UR STRIP-MCNC: NORMAL
SP GR UR STRIP: 1.01

## 2022-09-06 PROCEDURE — 99213 OFFICE O/P EST LOW 20 MIN: CPT

## 2022-09-06 RX ORDER — CEPHALEXIN 500 MG/1
500 CAPSULE ORAL TWICE DAILY
Qty: 14 | Refills: 0 | Status: DISCONTINUED | COMMUNITY
Start: 2022-08-23 | End: 2022-09-06

## 2022-09-06 NOTE — HISTORY OF PRESENT ILLNESS
[None] : no symptoms [FreeTextEntry1] : Denies any further incidence of hematuria. She completed her antibiotic course for UTI and states she feels much better. Here today for results of renal/bladder US.

## 2022-09-06 NOTE — ASSESSMENT
[FreeTextEntry1] : Gross hematuria\par \par Urine dip> trace blood\par Results of renal/bladder US reviewed and discussed> normal\par Pt is scheduled for Cysto 9/19 with Dr. Morales, if Cysto negative can f/u 3 months for U/A

## 2022-09-08 ENCOUNTER — APPOINTMENT (OUTPATIENT)
Dept: ORTHOPEDIC SURGERY | Facility: CLINIC | Age: 47
End: 2022-09-08

## 2022-09-08 ENCOUNTER — OUTPATIENT (OUTPATIENT)
Dept: OUTPATIENT SERVICES | Facility: HOSPITAL | Age: 47
LOS: 1 days | End: 2022-09-08
Payer: MEDICAID

## 2022-09-08 ENCOUNTER — APPOINTMENT (OUTPATIENT)
Dept: ULTRASOUND IMAGING | Facility: CLINIC | Age: 47
End: 2022-09-08

## 2022-09-08 VITALS
BODY MASS INDEX: 30.44 KG/M2 | SYSTOLIC BLOOD PRESSURE: 157 MMHG | HEIGHT: 59 IN | WEIGHT: 151 LBS | HEART RATE: 75 BPM | DIASTOLIC BLOOD PRESSURE: 101 MMHG

## 2022-09-08 DIAGNOSIS — Z98.89 OTHER SPECIFIED POSTPROCEDURAL STATES: Chronic | ICD-10-CM

## 2022-09-08 DIAGNOSIS — Z98.891 HISTORY OF UTERINE SCAR FROM PREVIOUS SURGERY: Chronic | ICD-10-CM

## 2022-09-08 DIAGNOSIS — M79.604 PAIN IN RIGHT LEG: ICD-10-CM

## 2022-09-08 DIAGNOSIS — Z90.710 ACQUIRED ABSENCE OF BOTH CERVIX AND UTERUS: Chronic | ICD-10-CM

## 2022-09-08 PROCEDURE — 99213 OFFICE O/P EST LOW 20 MIN: CPT

## 2022-09-08 PROCEDURE — 93971 EXTREMITY STUDY: CPT | Mod: 26,RT

## 2022-09-08 PROCEDURE — 93971 EXTREMITY STUDY: CPT

## 2022-09-08 NOTE — HISTORY OF PRESENT ILLNESS
[de-identified] : 09/08/2022 : LIAM TORRE  is a 46 year year old female who presents to the office for follow-up evaluation of her right knee.  She states that she had a twisting injury back on 1/10/2021 and had a bucket-handle meniscus tear that she was planning on doing surgery on.  She states however she developed a DVT and was on blood thinner for 3 months and the knee was not bothering her too much so she was just living with it.  She states however she does get buckling, locking, clicking sensations in the knee that can be very painful and recently a month or so ago had a twisting injury to the knee and since then has had more pain and swelling in the knee again.  She states she would like to consider surgical intervention again however she recently did know that she is having more calf pain again and is concerned about the potential risk of a repeat DVT.  She states the pain is about the medial and lateral aspect of the knee and can be sharp and is worse with twisting activities and motions and alleviated with rest.  She states the calf is also very tender to the touch.  She denies any fevers, chills, chest pain, shortness of breath.  She denies any numbness or tingling distally.  She is here for specialist opinion today.\par \par 3/29/21: LIAM is a 45 year old female who presents today for initial evaluation of right knee pain following twisting injury on 1/10/21.  She has hx. of right knee injury at age 18 in her country of [place]. She did not seek treatment at that time.  She admits to experiencing frequent knee instability for many years leading up to her most recent injury. In January of this year she twisted her knee dancing with friends. She twisted, felt a tear, and had progressively worsening swelling. She went to the ED on 1/10/21 where x-rays of the right knee were obtained which were unremarkable for osseous pathology.  She followed up with Dr. Elizabeth 9 days later who at that time ordered an MRI and Meloxicam.  \par \par Currently, patient presents wearing a neoprene knee sleeve that she states is too small for her.  She admits to inability to fully WB in the mornings.  She denies mechanical symptoms such as locking, catching, or popping.  She does, however, admits to feeling extremely unstable and weak.  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  The patient denies numbness/tingling to the extremity.  She also denies recent fever, chills, redness, nausea, vomiting, or illness.\par

## 2022-09-08 NOTE — PHYSICAL EXAM
[de-identified] : General:\par Awake, alert, no acute distress, Patient was cooperative and appropriate during the examination.\par \par The patient is of normal weight for height and age.\par \par Walks with an antalgic gait on the right side.\par \par Full, painless range of motion of the neck and back.\par \par Exam of the bilateral lower extremities is intact and symmetric with regards to dermatologic, vascular, and neurologic exam. Bilateral lower extremity sensation is grossly intact to light touch in the DP/SP/T/S/S nerve distributions. Intact DF/PF/EHL. BIlateral lower extremity warm and well-perfused with brisk capillary refill.\par \par Pulmonary:\par Regular, nonlabored breathing\par \par Abdomen:\par Soft, nontender, nondistended.\par \par Lymphatic:\par No evidence of inguinal lymphadenopathy\par \par Right knee Examination:\par Physical examination of the knee demonstrates normal skin without signs of skin changes or abnormalities. No erythema or warmth is appreciated. There is a mild joint effusion.\par \par Sensation is intact to light touch L2-S1\par Palpable DP/PT pulse\par EHL/FHL/TA/GSC motor function intact\par \par Range of Motion\par 0 to 130 degrees with pain at terminal flexion\par \par Strength Testing\par Quadriceps/Hamstring 5/5\par Patient is able to perform a straight leg raise without difficulty.\par \par Palpation\par Not tender to palpation about the distal femur, proximal tibia, or patella\par No palpable defect appreciated in the quadriceps or patellar tendons\par Moderately tender to palpation of medial joint line\par Mildly tender to palpation of lateral joint line\par \par Special Tests\par Anterior Drawer positive\par Posterior Drawer negative\par Lachman Exam grade 2B\par No Varus or Valgus Laxity at 0 or 30 degrees of knee flexion\par Chapincito's Test positive medially\par Active compression of the patella is negative for pain\par Translation of the patella less than 2 quadrants with a firm endpoint [de-identified] : An ultrasound was performed on 9/8/2022 at a [facility] imaging facility after our office visit that showed no evidence of DVT.\par \par The following radiographs were obtained at the ED on 1/10/21.  I reviewed each radiograph in detail with the patient and discussed the findings as highlighted below.\par \par 3 views of the right knee were unremarkable.\par \par MRI obtained from a [facility] imaging facility was reviewed in great detail with the patient and reveals:\par 1. Anterior cruciate ligament rupture is of indeterminate age is no osseous contusions are appreciated.\par 2. Bucket-handle tear of the medial meniscus with large flap displaced into the intercondylar notch.\par 3. Mild medial gastrocnemius muscle strain.\par 4. Medial gastrocnemius tear is present that may be incidental. Ultrasound may be helpful for further evaluation of thrombophlebitis as warranted.\par \par \par \par \par \par \par

## 2022-09-09 ENCOUNTER — APPOINTMENT (OUTPATIENT)
Dept: ULTRASOUND IMAGING | Facility: CLINIC | Age: 47
End: 2022-09-09

## 2022-09-15 ENCOUNTER — APPOINTMENT (OUTPATIENT)
Dept: ORTHOPEDIC SURGERY | Facility: CLINIC | Age: 47
End: 2022-09-15

## 2022-09-15 VITALS
DIASTOLIC BLOOD PRESSURE: 95 MMHG | SYSTOLIC BLOOD PRESSURE: 154 MMHG | HEART RATE: 75 BPM | WEIGHT: 151 LBS | HEIGHT: 59 IN | BODY MASS INDEX: 30.44 KG/M2

## 2022-09-15 PROCEDURE — 99213 OFFICE O/P EST LOW 20 MIN: CPT

## 2022-09-15 NOTE — HISTORY OF PRESENT ILLNESS
[de-identified] : 9/15/2022: Nancy is a 46-year-old female returns to the office today for reassessment of her right knee pain.  At her previous office visit last week there was concern for possible DVT and she was sent for an Jf ultrasound which was negative for DVT.  She returns today to rediscuss options for her right knee pain secondary to an ACL and meniscus injury. She states her symptoms in her knee are unchanged since her last visit.  The patient denies any fevers, chills, sweats, recent illnesses, numbness, tingling, weakness, or pain elsewhere at this time.\par \par 09/08/2022 : LIAM TORRE  is a 46 year year old female who presents to the office for follow-up evaluation of her right knee.  She states that she had a twisting injury back on 1/10/2021 and had a bucket-handle meniscus tear that she was planning on doing surgery on.  She states however she developed a DVT and was on blood thinner for 3 months and the knee was not bothering her too much so she was just living with it.  She states however she does get buckling, locking, clicking sensations in the knee that can be very painful and recently a month or so ago had a twisting injury to the knee and since then has had more pain and swelling in the knee again.  She states she would like to consider surgical intervention again however she recently did know that she is having more calf pain again and is concerned about the potential risk of a repeat DVT.  She states the pain is about the medial and lateral aspect of the knee and can be sharp and is worse with twisting activities and motions and alleviated with rest.  She states the calf is also very tender to the touch.  She denies any fevers, chills, chest pain, shortness of breath.  She denies any numbness or tingling distally.  She is here for specialist opinion today.\par \par 3/29/21: LIAM is a 45 year old female who presents today for initial evaluation of right knee pain following twisting injury on 1/10/21.  She has hx. of right knee injury at age 18 in her country of [facility]. She did not seek treatment at that time.  She admits to experiencing frequent knee instability for many years leading up to her most recent injury. In January of this year she twisted her knee dancing with friends. She twisted, felt a tear, and had progressively worsening swelling. She went to the ED on 1/10/21 where x-rays of the right knee were obtained which were unremarkable for osseous pathology.  She followed up with Dr. Elizabeth 9 days later who at that time ordered an MRI and Meloxicam.  \par \par Currently, patient presents wearing a neoprene knee sleeve that she states is too small for her.  She admits to inability to fully WB in the mornings.  She denies mechanical symptoms such as locking, catching, or popping.  She does, however, admits to feeling extremely unstable and weak.  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  The patient denies numbness/tingling to the extremity.  She also denies recent fever, chills, redness, nausea, vomiting, or illness.\par

## 2022-09-15 NOTE — PHYSICAL EXAM
[de-identified] : General:\par Awake, alert, no acute distress, Patient was cooperative and appropriate during the examination.\par \par The patient is of normal weight for height and age.\par \par Walks with an antalgic gait on the right side.\par \par Full, painless range of motion of the neck and back.\par \par Exam of the bilateral lower extremities is intact and symmetric with regards to dermatologic, vascular, and neurologic exam. Bilateral lower extremity sensation is grossly intact to light touch in the DP/SP/T/S/S nerve distributions. Intact DF/PF/EHL. BIlateral lower extremity warm and well-perfused with brisk capillary refill.\par \par Pulmonary:\par Regular, nonlabored breathing\par \par Abdomen:\par Soft, nontender, nondistended.\par \par Lymphatic:\par No evidence of inguinal lymphadenopathy\par \par Right knee Examination:\par Physical examination of the knee demonstrates normal skin without signs of skin changes or abnormalities. No erythema or warmth is appreciated. There is a mild joint effusion.\par \par Sensation is intact to light touch L2-S1\par Palpable DP/PT pulse\par EHL/FHL/TA/GSC motor function intact\par \par Range of Motion\par 0 to 130 degrees with pain at terminal flexion\par \par Strength Testing\par Quadriceps/Hamstring 5/5\par Patient is able to perform a straight leg raise without difficulty.\par \par Palpation\par Not tender to palpation about the distal femur, proximal tibia, or patella\par No palpable defect appreciated in the quadriceps or patellar tendons\par Moderately tender to palpation of medial joint line\par Mildly tender to palpation of lateral joint line\par \par Special Tests\par Anterior Drawer positive\par Posterior Drawer negative\par Lachman Exam grade 2B\par No Varus or Valgus Laxity at 0 or 30 degrees of knee flexion\par Chapincito's Test positive medially\par Active compression of the patella is negative for pain\par Translation of the patella less than 2 quadrants with a firm endpoint [de-identified] : An ultrasound was performed on 9/8/2022 at a [facility] imaging facility after our office visit that showed no evidence of DVT.\par \par The following radiographs were obtained at the ED on 1/10/21.  I reviewed each radiograph in detail with the patient and discussed the findings as highlighted below.\par \par 3 views of the right knee were unremarkable.\par \par MRI obtained from a [facility] imaging facility was reviewed in great detail with the patient and reveals:\par 1. Anterior cruciate ligament rupture is of indeterminate age is no osseous contusions are appreciated.\par 2. Bucket-handle tear of the medial meniscus with large flap displaced into the intercondylar notch.\par 3. Mild medial gastrocnemius muscle strain.\par 4. Medial gastrocnemius tear is present that may be incidental. Ultrasound may be helpful for further evaluation of thrombophlebitis as warranted.\par \par \par \par \par \par \par

## 2022-09-19 ENCOUNTER — APPOINTMENT (OUTPATIENT)
Dept: UROLOGY | Facility: CLINIC | Age: 47
End: 2022-09-19

## 2022-09-28 ENCOUNTER — APPOINTMENT (OUTPATIENT)
Dept: MRI IMAGING | Facility: CLINIC | Age: 47
End: 2022-09-28

## 2022-09-28 ENCOUNTER — OUTPATIENT (OUTPATIENT)
Dept: OUTPATIENT SERVICES | Facility: HOSPITAL | Age: 47
LOS: 1 days | End: 2022-09-28

## 2022-09-28 DIAGNOSIS — Z98.89 OTHER SPECIFIED POSTPROCEDURAL STATES: Chronic | ICD-10-CM

## 2022-09-28 DIAGNOSIS — M25.561 PAIN IN RIGHT KNEE: ICD-10-CM

## 2022-09-28 DIAGNOSIS — Z98.891 HISTORY OF UTERINE SCAR FROM PREVIOUS SURGERY: Chronic | ICD-10-CM

## 2022-09-28 DIAGNOSIS — Z90.710 ACQUIRED ABSENCE OF BOTH CERVIX AND UTERUS: Chronic | ICD-10-CM

## 2022-09-28 PROCEDURE — 73721 MRI JNT OF LWR EXTRE W/O DYE: CPT | Mod: 26,RT

## 2022-11-23 ENCOUNTER — APPOINTMENT (OUTPATIENT)
Dept: ORTHOPEDIC SURGERY | Facility: CLINIC | Age: 47
End: 2022-11-23

## 2022-11-23 VITALS
WEIGHT: 149 LBS | BODY MASS INDEX: 30.04 KG/M2 | HEART RATE: 76 BPM | SYSTOLIC BLOOD PRESSURE: 138 MMHG | DIASTOLIC BLOOD PRESSURE: 89 MMHG | HEIGHT: 59 IN

## 2022-11-23 PROCEDURE — 99214 OFFICE O/P EST MOD 30 MIN: CPT

## 2022-12-08 ENCOUNTER — OUTPATIENT (OUTPATIENT)
Dept: OUTPATIENT SERVICES | Facility: HOSPITAL | Age: 47
LOS: 1 days | End: 2022-12-08
Payer: COMMERCIAL

## 2022-12-08 DIAGNOSIS — Z98.891 HISTORY OF UTERINE SCAR FROM PREVIOUS SURGERY: Chronic | ICD-10-CM

## 2022-12-08 DIAGNOSIS — Z98.89 OTHER SPECIFIED POSTPROCEDURAL STATES: Chronic | ICD-10-CM

## 2022-12-08 DIAGNOSIS — Z01.818 ENCOUNTER FOR OTHER PREPROCEDURAL EXAMINATION: ICD-10-CM

## 2022-12-08 DIAGNOSIS — Z90.710 ACQUIRED ABSENCE OF BOTH CERVIX AND UTERUS: Chronic | ICD-10-CM

## 2022-12-08 LAB
A1C WITH ESTIMATED AVERAGE GLUCOSE RESULT: 6.7 % — HIGH (ref 4–5.6)
ALBUMIN SERPL ELPH-MCNC: 4 G/DL — SIGNIFICANT CHANGE UP (ref 3.3–5.2)
ALP SERPL-CCNC: 95 U/L — SIGNIFICANT CHANGE UP (ref 40–120)
ALT FLD-CCNC: 33 U/L — HIGH
ANION GAP SERPL CALC-SCNC: 12 MMOL/L — SIGNIFICANT CHANGE UP (ref 5–17)
APTT BLD: 28.8 SEC — SIGNIFICANT CHANGE UP (ref 27.5–35.5)
AST SERPL-CCNC: 28 U/L — SIGNIFICANT CHANGE UP
BASOPHILS # BLD AUTO: 0.07 K/UL — SIGNIFICANT CHANGE UP (ref 0–0.2)
BASOPHILS NFR BLD AUTO: 0.5 % — SIGNIFICANT CHANGE UP (ref 0–2)
BILIRUB SERPL-MCNC: 0.2 MG/DL — LOW (ref 0.4–2)
BUN SERPL-MCNC: 9.8 MG/DL — SIGNIFICANT CHANGE UP (ref 8–20)
CALCIUM SERPL-MCNC: 9.3 MG/DL — SIGNIFICANT CHANGE UP (ref 8.4–10.5)
CHLORIDE SERPL-SCNC: 100 MMOL/L — SIGNIFICANT CHANGE UP (ref 96–108)
CO2 SERPL-SCNC: 23 MMOL/L — SIGNIFICANT CHANGE UP (ref 22–29)
CREAT SERPL-MCNC: 0.56 MG/DL — SIGNIFICANT CHANGE UP (ref 0.5–1.3)
EGFR: 113 ML/MIN/1.73M2 — SIGNIFICANT CHANGE UP
EOSINOPHIL # BLD AUTO: 0.11 K/UL — SIGNIFICANT CHANGE UP (ref 0–0.5)
EOSINOPHIL NFR BLD AUTO: 0.8 % — SIGNIFICANT CHANGE UP (ref 0–6)
ESTIMATED AVERAGE GLUCOSE: 146 MG/DL — HIGH (ref 68–114)
GLUCOSE SERPL-MCNC: 148 MG/DL — HIGH (ref 70–99)
HCT VFR BLD CALC: 36.2 % — SIGNIFICANT CHANGE UP (ref 34.5–45)
HGB BLD-MCNC: 12.6 G/DL — SIGNIFICANT CHANGE UP (ref 11.5–15.5)
IMM GRANULOCYTES NFR BLD AUTO: 0.8 % — SIGNIFICANT CHANGE UP (ref 0–0.9)
INR BLD: 1.04 RATIO — SIGNIFICANT CHANGE UP (ref 0.88–1.16)
LYMPHOCYTES # BLD AUTO: 18.7 % — SIGNIFICANT CHANGE UP (ref 13–44)
LYMPHOCYTES # BLD AUTO: 2.49 K/UL — SIGNIFICANT CHANGE UP (ref 1–3.3)
MCHC RBC-ENTMCNC: 30.9 PG — SIGNIFICANT CHANGE UP (ref 27–34)
MCHC RBC-ENTMCNC: 34.8 GM/DL — SIGNIFICANT CHANGE UP (ref 32–36)
MCV RBC AUTO: 88.7 FL — SIGNIFICANT CHANGE UP (ref 80–100)
MONOCYTES # BLD AUTO: 0.65 K/UL — SIGNIFICANT CHANGE UP (ref 0–0.9)
MONOCYTES NFR BLD AUTO: 4.9 % — SIGNIFICANT CHANGE UP (ref 2–14)
NEUTROPHILS # BLD AUTO: 9.91 K/UL — HIGH (ref 1.8–7.4)
NEUTROPHILS NFR BLD AUTO: 74.3 % — SIGNIFICANT CHANGE UP (ref 43–77)
PLATELET # BLD AUTO: 478 K/UL — HIGH (ref 150–400)
POTASSIUM SERPL-MCNC: 4.3 MMOL/L — SIGNIFICANT CHANGE UP (ref 3.5–5.3)
POTASSIUM SERPL-SCNC: 4.3 MMOL/L — SIGNIFICANT CHANGE UP (ref 3.5–5.3)
PROT SERPL-MCNC: 7.4 G/DL — SIGNIFICANT CHANGE UP (ref 6.6–8.7)
PROTHROM AB SERPL-ACNC: 12.1 SEC — SIGNIFICANT CHANGE UP (ref 10.5–13.4)
RBC # BLD: 4.08 M/UL — SIGNIFICANT CHANGE UP (ref 3.8–5.2)
RBC # FLD: 12.2 % — SIGNIFICANT CHANGE UP (ref 10.3–14.5)
SARS-COV-2 RNA SPEC QL NAA+PROBE: SIGNIFICANT CHANGE UP
SODIUM SERPL-SCNC: 135 MMOL/L — SIGNIFICANT CHANGE UP (ref 135–145)
WBC # BLD: 13.34 K/UL — HIGH (ref 3.8–10.5)
WBC # FLD AUTO: 13.34 K/UL — HIGH (ref 3.8–10.5)

## 2022-12-08 PROCEDURE — G0463: CPT

## 2022-12-08 NOTE — CHART NOTE - NSCHARTNOTEFT_GEN_A_CORE
All available labs noted as documented, all abnormal labs noted as documented and have been faxed to surgeon, and to PCP with whom medical clearance is pending. Spoke to lab A1C added on and pending, COVID PCR pending. Spoke to Tammie manager at Dr. Armstrong's office regarding WBC, ALT and PLT count as documented. Capo MS, FNP-BC
All available labs noted, all abnormal labs noted as documented and have been faxed to surgeon and to PCP with whom medical clearance is pending. COVID PCR, CMP and coags pending. CBC results emailed to surgeon. Capo DAO, FNP-BC
All available labs noted as documented, all abnormal labs noted as documented and have been faxed to PCP with whom medical clearance is pending. COVID PCR pending. Spoke to pt. via phone re. A1C as documented and advised pt. to f/u with PCP re. A1C and pt. agreed.

## 2022-12-12 ENCOUNTER — APPOINTMENT (OUTPATIENT)
Dept: ORTHOPEDIC SURGERY | Facility: AMBULATORY SURGERY CENTER | Age: 47
End: 2022-12-12

## 2022-12-12 PROCEDURE — 29880 ARTHRS KNE SRG MNISECTMY M&L: CPT | Mod: RT

## 2022-12-12 RX ORDER — OXYCODONE 5 MG/1
5 TABLET ORAL
Qty: 28 | Refills: 0 | Status: ACTIVE | COMMUNITY
Start: 2022-12-12 | End: 1900-01-01

## 2022-12-12 RX ORDER — ASPIRIN 325 MG/1
325 TABLET, FILM COATED ORAL DAILY
Qty: 14 | Refills: 0 | Status: ACTIVE | COMMUNITY
Start: 2022-12-12 | End: 1900-01-01

## 2022-12-29 ENCOUNTER — APPOINTMENT (OUTPATIENT)
Dept: ORTHOPEDIC SURGERY | Facility: CLINIC | Age: 47
End: 2022-12-29

## 2022-12-29 PROCEDURE — 99024 POSTOP FOLLOW-UP VISIT: CPT

## 2022-12-29 NOTE — HISTORY OF PRESENT ILLNESS
[___ Weeks Post Op] : [unfilled] weeks post op [de-identified] : Status post right knee arthroscopy with partial medial and lateral meniscectomy, chondroplasty, and synovectomy on 12/12/2022 [de-identified] : Marilyn is a pleasant 47-year-old female returns to the office today status post right knee arthroscopy.  She has had improvements in her pain and swelling.  She continues to ambulate with crutches for support.  She has not started physical therapy.  She has otherwise been compliant with the postoperative restrictions returns today for routine follow-up.  She denies any fevers, chills, sweats, redness, warmth, drainage, numbness, tingling, or pain elsewhere. [de-identified] : On exam, the patient is pleasant.  They  are awake, alert, and oriented x3.  The patient walks with crutches for support.\par Weight is appropriate for height\par Full range of motion of cervical spine without instability\par Full range of motion of back without instability\par Intact neurologic, vascular, and dermatologic exam to right and left upper extremities\par Intact neurologic, vascular, and dermatologic exam to right and left lower extremities\par \par Right lower Extremity\par The patient's arthroscopy portals are well approximated and healing well. No erythema, warmth, or drainage. There is a moderate postoperative effusion.  Mild medial lateral joint line tenderness palpation.  No bony tenderness to palpation about the knee. Range of motion: 0 to 90 degrees.  Anterior drawer negative, Posterior Drawer negative, Lachman negative. No varus or valgus laxity at 0 or 30 degrees of knee flexion.  EHL/FHL/TA/GSC motor function is intact, sensations intact light touch in L2-S1 distributions, DP/PT pulses are palpable, compartments are soft and compressible, there is no calf tenderness to palpation bilaterally. [de-identified] : 47-year-old female status post right knee arthroscopic partial medial and lateral meniscectomy, chondroplasty, and synovectomy on 12/12/2022 [de-identified] : Marilyn is recovering well from her knee arthroscopy.  She has had improvements in her pain and swelling.  At this point she should advance to weightbearing as tolerated and wean off of her crutches altogether.  She will continue to ice the knee for any residual swelling and she may take Tylenol or Motrin as needed for pain.  A referral for physical therapy was provided to begin working on range of motion exercises and early strengthening exercises for her knee.  She will avoid any vigorous exercise or activity at this time.  Recommend follow-up in 4 weeks for repeat clinical assessment.  She verbalizes her understanding and agrees with the plan.  All questions were answered to her satisfaction.

## 2023-01-26 ENCOUNTER — APPOINTMENT (OUTPATIENT)
Dept: ORTHOPEDIC SURGERY | Facility: CLINIC | Age: 48
End: 2023-01-26
Payer: MEDICAID

## 2023-01-26 DIAGNOSIS — S83.289A OTHER TEAR OF LATERAL MENISCUS, CURRENT INJURY, UNSPECIFIED KNEE, INITIAL ENCOUNTER: ICD-10-CM

## 2023-01-26 DIAGNOSIS — S83.249A OTHER TEAR OF MEDIAL MENISCUS, CURRENT INJURY, UNSPECIFIED KNEE, INITIAL ENCOUNTER: ICD-10-CM

## 2023-01-26 PROCEDURE — 99024 POSTOP FOLLOW-UP VISIT: CPT

## 2023-01-26 NOTE — HISTORY OF PRESENT ILLNESS
[___ Weeks Post Op] : [unfilled] weeks post op [de-identified] : Status post right knee arthroscopy with partial medial and lateral meniscectomy, chondroplasty, and synovectomy on 12/12/2022 [de-identified] : Marilyn is a pleasant 47-year-old female returns to the office today status post right knee arthroscopy.  She has had improvements in her pain and swelling.  She states she is doing better since her last office visit and has been getting a little cramping sensations into her calf but overall she states her pain is doing better.  She states she still has a little bit of stiffness but is overall improving with physical therapy.  She is here for routine follow-up today.  She denies any fevers, chills, chest pain, shortness of breath.  She denies any numbness or tingling distally. [de-identified] : On exam, the patient is pleasant.  They  are awake, alert, and oriented x3.  The patient walks with no assistive devices.\par Weight is appropriate for height\par Full range of motion of cervical spine without instability\par Full range of motion of back without instability\par Intact neurologic, vascular, and dermatologic exam to right and left upper extremities\par Intact neurologic, vascular, and dermatologic exam to right and left lower extremities\par \par Right lower Extremity\par The patient's arthroscopy portals are healed.  No erythema, warmth, or drainage.  Minimal medial joint line tenderness palpation.  No bony tenderness to palpation about the knee. Range of motion: 0 to 110 degrees mildly decreased compared to contralateral side.  Anterior drawer negative, Posterior Drawer negative, Lachman negative. No varus or valgus laxity at 0 or 30 degrees of knee flexion.  EHL/FHL/TA/GSC motor function is intact, sensations intact light touch in L2-S1 distributions, DP/PT pulses are palpable, compartments are soft and compressible, there is no calf tenderness to palpation bilaterally. [de-identified] : 47-year-old female status post right knee arthroscopic partial medial and lateral meniscectomy, chondroplasty, and synovectomy on 12/12/2022 [de-identified] : Marilyn is recovering well from her knee arthroscopy.  She has had improvements in her pain and swelling.  At this time she can continue to weight-bear as tolerated on the right lower extremity we will continue with physical therapy for range of motion, pain relief, for assistance with ambulation and gait and for continued strengthening.  I emphasized the importance of physical therapy both formally and on her own to help with her ambulation, gait, pain, strength.  She was given a physical therapy prescription today to follow the postoperative protocol previously provided.  I advised her to continue to work on range of motion both formally and with physical therapy to regain full flexion.  She understood and agreed.  She will follow-up in 6 weeks for repeat evaluation to reassess.  Patient understands and agrees and all questions were answered.

## 2023-02-15 ENCOUNTER — OFFICE (OUTPATIENT)
Dept: URBAN - METROPOLITAN AREA CLINIC 112 | Facility: CLINIC | Age: 48
Setting detail: OPHTHALMOLOGY
End: 2023-02-15
Payer: COMMERCIAL

## 2023-02-15 DIAGNOSIS — H04.123: ICD-10-CM

## 2023-02-15 DIAGNOSIS — H52.4: ICD-10-CM

## 2023-02-15 DIAGNOSIS — H40.053: ICD-10-CM

## 2023-02-15 DIAGNOSIS — H43.393: ICD-10-CM

## 2023-02-15 PROCEDURE — 92014 COMPRE OPH EXAM EST PT 1/>: CPT | Performed by: OPHTHALMOLOGY

## 2023-02-15 PROCEDURE — 92015 DETERMINE REFRACTIVE STATE: CPT | Performed by: OPHTHALMOLOGY

## 2023-02-15 ASSESSMENT — CONFRONTATIONAL VISUAL FIELD TEST (CVF)
OD_FINDINGS: FULL
OS_FINDINGS: FULL

## 2023-02-15 ASSESSMENT — REFRACTION_AUTOREFRACTION
OS_SPHERE: +1.25
OD_SPHERE: +1.25
OS_AXIS: 173
OD_AXIS: 177
OD_CYLINDER: -0.50
OS_CYLINDER: -0.75

## 2023-02-15 ASSESSMENT — REFRACTION_MANIFEST
OD_VA2: 20/20
OD_SPHERE: +1.00
OS_VA2: 20/20
OD_VA1: 20/20
OS_ADD: +1.75
OS_AXIS: 174
OS_VA1: 20/20
OD_ADD: +1.75
OU_VA: 20/20
OS_SPHERE: +1.00
OD_CYLINDER: -0.50
OS_CYLINDER: -0.75
OD_AXIS: 176

## 2023-02-15 ASSESSMENT — TONOMETRY
OD_IOP_MMHG: 20
OS_IOP_MMHG: 21

## 2023-02-15 ASSESSMENT — VISUAL ACUITY
OS_BCVA: 20/25
OD_BCVA: 20/25

## 2023-02-15 ASSESSMENT — SPHEQUIV_DERIVED
OS_SPHEQUIV: 0.625
OD_SPHEQUIV: 0.75
OS_SPHEQUIV: 0.875
OD_SPHEQUIV: 1

## 2023-02-15 ASSESSMENT — AXIALLENGTH_DERIVED
OD_AL: 23.3482
OS_AL: 23.306
OD_AL: 23.2531
OS_AL: 23.2113

## 2023-02-15 ASSESSMENT — KERATOMETRY
OD_K2POWER_DIOPTERS: 44.00
OD_K1POWER_DIOPTERS: 42.75
OS_K1POWER_DIOPTERS: 43.00
OD_AXISANGLE_DEGREES: 079
OS_K2POWER_DIOPTERS: 44.25
OS_AXISANGLE_DEGREES: 092

## 2023-03-09 ENCOUNTER — APPOINTMENT (OUTPATIENT)
Dept: ORTHOPEDIC SURGERY | Facility: CLINIC | Age: 48
End: 2023-03-09
Payer: MEDICAID

## 2023-03-09 DIAGNOSIS — Z98.890 OTHER SPECIFIED POSTPROCEDURAL STATES: ICD-10-CM

## 2023-03-09 PROCEDURE — 99024 POSTOP FOLLOW-UP VISIT: CPT

## 2023-03-09 NOTE — HISTORY OF PRESENT ILLNESS
[de-identified] : Status post right knee arthroscopy with partial medial and lateral meniscectomy, chondroplasty, and synovectomy on 12/12/2022 [de-identified] : Marilyn is a pleasant 47-year-old female returns to the office today status post right knee arthroscopy.  The patient states that overall she feels much better and has minimal pain at this point.  She has returned to much of her daily activities.  However, she still does have some stiffness in flexion of the knee.  She has been going to physical therapy and overall feels improved since before surgery.  She returns today for routine assessment.  The patient denies any fevers, chills, sweats, recent illnesses, numbness, tingling, weakness, or pain elsewhere at this time. [de-identified] : On exam, the patient is pleasant.  They  are awake, alert, and oriented x3.  The patient walks with no assistive devices.\par Weight is appropriate for height\par Full range of motion of cervical spine without instability\par Full range of motion of back without instability\par Intact neurologic, vascular, and dermatologic exam to right and left upper extremities\par Intact neurologic, vascular, and dermatologic exam to right and left lower extremities\par \par Right lower Extremity\par The patient's arthroscopy portals are healed.  No erythema, warmth, or drainage.  Minimal medial joint line tenderness palpation.  No bony tenderness to palpation about the knee. Range of motion: 0 to 115 degrees mildly decreased compared to contralateral side.  Anterior drawer negative, Posterior Drawer negative, Lachman negative. No varus or valgus laxity at 0 or 30 degrees of knee flexion.  EHL/FHL/TA/GSC motor function is intact, sensations intact light touch in L2-S1 distributions, DP/PT pulses are palpable, compartments are soft and compressible, there is no calf tenderness to palpation bilaterally. [de-identified] : 47-year-old female status post right knee arthroscopic partial medial and lateral meniscectomy, chondroplasty, and synovectomy on 12/12/2022 [de-identified] : Marilyn is recovering well from her knee arthroscopy.  Overall her pain and swelling have substantially improved since the time of surgery.  She is feeling better and walking better but does still have some residual stiffness in the knee.  She would like to continue with physical therapy so that she can continue to improve range of motion.  A new referral was provided today.  Otherwise she may gradually return to her normal activities as tolerated without restriction.  Recommend follow-up in 3 months for repeat clinical assessment.  The patient verbalizes her understanding and agrees with the plan.  All questions were answered to her satisfaction.

## 2023-05-25 ENCOUNTER — APPOINTMENT (OUTPATIENT)
Dept: ORTHOPEDIC SURGERY | Facility: CLINIC | Age: 48
End: 2023-05-25
Payer: MEDICAID

## 2023-05-25 VITALS
WEIGHT: 149 LBS | DIASTOLIC BLOOD PRESSURE: 83 MMHG | HEIGHT: 59 IN | SYSTOLIC BLOOD PRESSURE: 126 MMHG | BODY MASS INDEX: 30.04 KG/M2 | HEART RATE: 81 BPM

## 2023-05-25 DIAGNOSIS — M25.561 PAIN IN RIGHT KNEE: ICD-10-CM

## 2023-05-25 PROCEDURE — 99213 OFFICE O/P EST LOW 20 MIN: CPT

## 2023-05-25 RX ORDER — MELOXICAM 15 MG/1
15 TABLET ORAL
Qty: 30 | Refills: 1 | Status: ACTIVE | COMMUNITY
Start: 2023-05-25 | End: 1900-01-01

## 2023-05-25 NOTE — REASON FOR VISIT
[Follow-Up Visit] : a follow-up visit for [FreeTextEntry2] : Status post right knee arthroscopy with partial medial and lateral meniscectomy, chondroplasty, and synovectomy on 12/12/2022.

## 2023-05-25 NOTE — HISTORY OF PRESENT ILLNESS
[de-identified] : Status post right knee arthroscopy with partial medial and lateral meniscectomy, chondroplasty, and synovectomy on 12/12/2022 [de-identified] : Marilyn is a pleasant 47-year-old female returns to the office today status post right knee arthroscopy.  The patient states that overall she was doing very well postoperatively and was having minimal pain.  She states however recently without any new acute exacerbate activities she started developing pain in the medial and lateral aspect of the knee and having swelling into the knee and lower leg.  She states it is worse when she is on her feet for long periods of time and worse at the end of the day and alleviated with rest and she is here for routine follow-up to reassess because of her worsening pain.  She states that since the last office visit she has not had any formal physical therapy.  She denies any fevers, chills, chest pain, shortness of breath.  She denies any numbness or tingling distally. [de-identified] : On exam, the patient is pleasant.  They  are awake, alert, and oriented x3.  The patient walks with no assistive devices.\par Weight is appropriate for height\par Full range of motion of cervical spine without instability\par Full range of motion of back without instability\par Intact neurologic, vascular, and dermatologic exam to right and left upper extremities\par Intact neurologic, vascular, and dermatologic exam to right and left lower extremities\par \par Right lower Extremity\par The patient's arthroscopy portals are healed.  No erythema, warmth, or drainage.  Moderately tender on the medial and lateral joint line.  Mildly tender over the lateral gastrocnemius.  Negative Homans test.  Negative Abraham's test.  No bony tenderness to palpation about the knee. Range of motion: 0 to 115 degrees mildly decreased compared to contralateral side.  Anterior drawer negative, Posterior Drawer negative, Lachman negative. No varus or valgus laxity at 0 or 30 degrees of knee flexion.  EHL/FHL/TA/GSC motor function is intact, sensations intact light touch in L2-S1 distributions, DP/PT pulses are palpable, compartments are soft and compressible, there is no calf tenderness to palpation bilaterally. [de-identified] : 47-year-old female status post right knee arthroscopic partial medial and lateral meniscectomy, chondroplasty, and synovectomy on 12/12/2022 [de-identified] : Marilyn is recovering well from her knee arthroscopy.  At this time due to her worsening onset pain I am recommending that we reinitiate formal physical therapy and anti-inflammatories for pain and inflammation.  A new prescription for physical therapy was provided today and a prescription for meloxicam 15 mg to be taken once daily with food for pain and inflammation was sent to the pharmacy today.  She should avoid exacerbating activities and use ice and heat as needed for symptomatic relief.  She was offered a Doppler to rule out a DVT which is of low suspicion based on today's examination but she declined because this is the same pain that she had prior to the surgery and she had a negative Doppler before the surgery.  I feel this is reasonable.  I did advise her to monitor for any evidence of a DVT including increasing swelling, pain, redness, warmth and to call if her clinical picture changes for a stat Doppler or to go to the emergency room.  She will otherwise follow-up in 6 weeks for repeat evaluation to reassess and if symptoms were to persist despite conservative treatment we may recommend a Doppler versus cortisone injection versus repeat MRI pending reexamination in the office.  Patient understands and agrees and all questions were answered.

## 2023-12-20 NOTE — ED ADULT NURSE NOTE - PRO INTERPRETER NEED 2
English decreased ability to use arms for pushing/pulling/decreased ability to use legs for bridging/pushing

## 2024-03-20 ENCOUNTER — APPOINTMENT (OUTPATIENT)
Dept: ORTHOPEDIC SURGERY | Facility: CLINIC | Age: 49
End: 2024-03-20
Payer: SELF-PAY

## 2024-03-20 VITALS
WEIGHT: 153 LBS | HEIGHT: 59 IN | BODY MASS INDEX: 30.84 KG/M2 | DIASTOLIC BLOOD PRESSURE: 85 MMHG | HEART RATE: 81 BPM | SYSTOLIC BLOOD PRESSURE: 148 MMHG

## 2024-03-20 DIAGNOSIS — M25.521 PAIN IN RIGHT ELBOW: ICD-10-CM

## 2024-03-20 DIAGNOSIS — M79.631 PAIN IN RIGHT FOREARM: ICD-10-CM

## 2024-03-20 PROCEDURE — 99213 OFFICE O/P EST LOW 20 MIN: CPT | Mod: 25

## 2024-03-20 PROCEDURE — 73080 X-RAY EXAM OF ELBOW: CPT | Mod: RT

## 2024-03-20 RX ORDER — MELOXICAM 15 MG/1
15 TABLET ORAL
Qty: 21 | Refills: 0 | Status: ACTIVE | COMMUNITY
Start: 2024-03-20 | End: 1900-01-01

## 2024-03-20 NOTE — PHYSICAL EXAM
[de-identified] : General: Awake, alert, no acute distress, Patient was cooperative and appropriate during the examination.  The patient is of normal weight for height and age.  Walks without an antalgic gait.  Right elbow Exam: Physical exam of the elbow demonstrates normal skin without signs of skin changes or abnormalities. No erythema, warmth, or joint effusion appreciated.  There is mild ecchymosis over the volar aspect of the forearm with mild tenderness palpation in this region   Sensation intact light touch C5-T1 Palpable radial pulse Radial/ulnar/median/axillary/musculocutaneous/AIN/PIN nerves grossly intact   Range of motion: Flexion-Extension 0-130 Pronation-Supination 85-85   Palpation: Exquisitely tender to palpation over the lateral epicondyle and common extensor origin Not tender palpation over the medial epicondyle Not tender to palpation over the radial head Not tender to palpation over the olecranon Not tender to palpation over the distal biceps insertion Not tender to palpation over the distal triceps insertion Not tender to palpation over the cubital tunnel Mildly tender over the volar aspect of the forearm over the flexor tendons in the area of distribution of the bruising   Strength testing: Elbow Flexion 5/5 Elbow Extension 5/5 Pronation 5/5 Supination 5/5   Special Tests: No varus/valgus laxity at 0 and 30 degrees of elbow flexion Moderate pain with resisted wrist/finger extension and forearm supination No pain with resisted wrist/finger flexion and forearm pronation Negative hook test Negative Tinel's over the carpal and cubital tunnels [de-identified] : X-rays 3 views of the right elbow taken the office today on 3/20/2024 shows no acute fracture or dislocation.

## 2024-03-20 NOTE — HISTORY OF PRESENT ILLNESS
[de-identified] : 03/20/2024 : LIAM TORRE  is a 48 year  old female who presents to the office for evaluation of her right elbow and forearm pain.  She states since December she was doing some lifting and blow drying with her hair and states that the couple days after this she developed a lot of pain in the lateral aspect of the elbow that radiated down the forearm and is worse certain movements and activities.  She states it got so severe that she was unable to lift things with the arm for period time.  She tried a shot of dexamethasone into the glutes which helped for 1 day and took some over-the-counter medications which only helped a little bit.  She states she recently 1 week ago went for a massage that made her symptoms worse and cause bruising over her forearm.  She is here for specialist opinion because she has a lot of pain in the elbow.  She states the pain is now over the volar aspect of the elbow where the bruising is but that is new since the massage.  She states the previous pain was over the lateral aspect of the elbow and radiated down the forearm.  She denies any numbness or tingling distally.  She has no other complaints today.  She denies any acute traumatic injury.

## 2024-03-20 NOTE — DISCUSSION/SUMMARY
[de-identified] :   Assessment: Patient 48-year-old female with right lateral epicondylitis with flexor tendon muscle strain/contusion  Plan: I had a long discussion with the patient today regarding the nature of their diagnosis and treatment plan. We discussed the risks and benefits of no treatment as well as nonoperative and operative treatments.  I reviewed the x-rays that revealed no acute bony pathology.  On examination today her point of maximal tenderness over the lateral epicondyle consistent with tennis elbow.  At this time I recommend conservative treatment including ice, heat, rest, Mobic 15 mg to be taken once daily with food for pain and inflammation, physical therapy and bracing for symptomatic relief.  She was shown a brace in the office today and shown stretching exercises in the office today.  We discussed obtaining an over-the-counter counterforce strap.  She will avoid exacerbating activities and will follow-up in 6 to 8 weeks for repeat evaluation to reassess and we may consider injection at that time versus MRI pending reevaluation.  Patient seen and examined with Dr. Landa today.   The patient verbalizes their understanding and agrees with the plan.  All questions were answered to their satisfaction.